# Patient Record
Sex: FEMALE | Race: WHITE
[De-identification: names, ages, dates, MRNs, and addresses within clinical notes are randomized per-mention and may not be internally consistent; named-entity substitution may affect disease eponyms.]

---

## 2021-02-23 ENCOUNTER — HOSPITAL ENCOUNTER (INPATIENT)
Dept: HOSPITAL 12 - REHABOV3 | Age: 86
LOS: 16 days | Discharge: ANOTHER HOSPITAL | DRG: 559 | End: 2021-03-11
Attending: PHYSICAL MEDICINE & REHABILITATION | Admitting: PHYSICAL MEDICINE & REHABILITATION
Payer: MEDICARE

## 2021-02-23 VITALS — WEIGHT: 132 LBS | HEIGHT: 60 IN | BODY MASS INDEX: 25.91 KG/M2

## 2021-02-23 VITALS — DIASTOLIC BLOOD PRESSURE: 60 MMHG | SYSTOLIC BLOOD PRESSURE: 135 MMHG

## 2021-02-23 DIAGNOSIS — Z85.828: ICD-10-CM

## 2021-02-23 DIAGNOSIS — M19.90: ICD-10-CM

## 2021-02-23 DIAGNOSIS — S72.22XD: Primary | ICD-10-CM

## 2021-02-23 DIAGNOSIS — K57.30: ICD-10-CM

## 2021-02-23 DIAGNOSIS — I21.4: ICD-10-CM

## 2021-02-23 DIAGNOSIS — H40.9: ICD-10-CM

## 2021-02-23 DIAGNOSIS — I10: ICD-10-CM

## 2021-02-23 DIAGNOSIS — R06.02: ICD-10-CM
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
## 2021-02-23 DIAGNOSIS — J44.9: ICD-10-CM

## 2021-02-23 DIAGNOSIS — Z91.041: ICD-10-CM

## 2021-02-23 DIAGNOSIS — E78.5: ICD-10-CM

## 2021-02-23 DIAGNOSIS — M79.7: ICD-10-CM

## 2021-02-23 DIAGNOSIS — D64.9: ICD-10-CM

## 2021-02-23 DIAGNOSIS — M81.0: ICD-10-CM

## 2021-02-23 DIAGNOSIS — G43.909: ICD-10-CM

## 2021-02-23 DIAGNOSIS — Z88.8: ICD-10-CM

## 2021-02-23 DIAGNOSIS — I25.10: ICD-10-CM

## 2021-02-23 DIAGNOSIS — M85.80: ICD-10-CM

## 2021-02-23 DIAGNOSIS — H35.30: ICD-10-CM

## 2021-02-23 PROCEDURE — A4663 DIALYSIS BLOOD PRESSURE CUFF: HCPCS

## 2021-02-23 NOTE — NUR
Admitted from [facility] a 85 yr old female with an admitting diagnosis of

S/P ORIF left femur on 02/11/21 by Dr Noriega.AAOx4, demanding at times

with some periods of forgetfulness. Hx of HTN, Skin Ca, Glaucoma, 

Hyperlipidemia, SVT, Fibromyalgia. VSS Patient refused to have body

check, or take picture of her surgical wound. Patient says  she wants

the doctor to check her incision because its a sterile technique. All 

needs attended. Able to ambulate with walker at short distances. Fall

precautions maintained. Voided in bedside commode. Dr Daugherty and Dr Alston aware of patient's admission. Dr Daugherty says he will reconcile 

her meds in am. Denies any pain at this time. Will monitor patient. No

acute distress noted. No acute distress noted.

## 2021-02-24 VITALS — DIASTOLIC BLOOD PRESSURE: 46 MMHG | SYSTOLIC BLOOD PRESSURE: 102 MMHG

## 2021-02-24 VITALS — SYSTOLIC BLOOD PRESSURE: 125 MMHG | DIASTOLIC BLOOD PRESSURE: 57 MMHG

## 2021-02-24 VITALS — SYSTOLIC BLOOD PRESSURE: 124 MMHG | DIASTOLIC BLOOD PRESSURE: 61 MMHG

## 2021-02-24 VITALS — SYSTOLIC BLOOD PRESSURE: 133 MMHG | DIASTOLIC BLOOD PRESSURE: 52 MMHG

## 2021-02-24 RX ADMIN — Medication SCH DROP: at 17:27

## 2021-02-24 RX ADMIN — CALCIUM SCH MG: 500 TABLET ORAL at 17:03

## 2021-02-24 RX ADMIN — Medication SCH DROP: at 17:06

## 2021-02-24 RX ADMIN — Medication SCH DROP: at 17:07

## 2021-02-24 RX ADMIN — Medication PRN MG: at 17:06

## 2021-02-24 RX ADMIN — ACETAMINOPHEN PRN MG: 325 TABLET ORAL at 21:03

## 2021-02-24 RX ADMIN — Medication SCH DROP: at 13:30

## 2021-02-24 RX ADMIN — Medication PRN MG: at 04:11

## 2021-02-24 RX ADMIN — Medication SCH MG: at 17:04

## 2021-02-24 RX ADMIN — GABAPENTIN SCH MG: 100 CAPSULE ORAL at 20:56

## 2021-02-24 RX ADMIN — Medication PRN MG: at 23:17

## 2021-02-24 RX ADMIN — SENNOSIDES SCH TAB: 8.6 TABLET, COATED ORAL at 20:56

## 2021-02-24 RX ADMIN — Medication PRN MG: at 09:40

## 2021-02-24 RX ADMIN — Medication SCH DROP: at 20:58

## 2021-02-24 RX ADMIN — ATORVASTATIN CALCIUM SCH MG: 10 TABLET, FILM COATED ORAL at 20:56

## 2021-02-24 RX ADMIN — SIMETHICONE CHEW TAB 80 MG PRN MG: 80 TABLET ORAL at 18:01

## 2021-02-24 RX ADMIN — DOCUSATE SODIUM SCH MG: 100 CAPSULE, LIQUID FILLED ORAL at 17:03

## 2021-02-24 NOTE — NUR
Received pt resting in bed and watching tv. AAO x4. No acute distress noted. C/o mild pain, 
Tylenol PRN given. Other due meds given as ordered. Safety measures maintained. Call light 
and personal items within reach. Will continue to monitor.

## 2021-02-24 NOTE — NUR
End of shift notes: Patient slept at short intervals.

Assisted to bedside commode.Voiding ok. No acute

distress noted. Pain meds given as needed. Relief

noted. All needs attended and met.

## 2021-02-24 NOTE — NUR
Received pt in bed, awake, A&Ox4, able to verbalize needs, no acute distress noted. Pt on 
RA, no SOB. Pt refuses assessment of surgical dressings at this time. Safety measures, fall 
precautions in place. Call light and belongings within reach. Will continue to monitor.

## 2021-02-24 NOTE — NUR
It was reported that pt has allergy to benadryl, pt denies it and stated that it was a 
mistake in the system. Pt stated that she had allergic reaction IV dye that was done in 
[facility] [place], causing chest rash. Notified Amrita with new order for Benadryl 25mg PO Q6H PRN for 
itching. Will carry out order.

## 2021-02-24 NOTE — NUR
EOSS: Pt in bed, awake, able to verbalize needs throughout shift, needs met promptly. No 
SOB, no acute distress noted. Pt continued to refuse assessment of L hip surgical dressing. 
L knee surgical dressing clean, dry, intact. Pt c/o pain, prn pain medication administered 
per order. Medications reconciled, administered per order. Pt refused artificial tears. 
Safety measures, fall precautions maintained during shift. Call light and belongings within 
reach. Care rendered per order. At end of shift, pt noted with redness on chest. Pt denied 
itchiness, feeling hot, SOB. VSS. Will endorse to night shift.

## 2021-02-25 VITALS — SYSTOLIC BLOOD PRESSURE: 136 MMHG | DIASTOLIC BLOOD PRESSURE: 49 MMHG

## 2021-02-25 VITALS — DIASTOLIC BLOOD PRESSURE: 53 MMHG | SYSTOLIC BLOOD PRESSURE: 120 MMHG

## 2021-02-25 VITALS — SYSTOLIC BLOOD PRESSURE: 134 MMHG | DIASTOLIC BLOOD PRESSURE: 57 MMHG

## 2021-02-25 VITALS — DIASTOLIC BLOOD PRESSURE: 59 MMHG | SYSTOLIC BLOOD PRESSURE: 155 MMHG

## 2021-02-25 RX ADMIN — Medication SCH MG: at 16:54

## 2021-02-25 RX ADMIN — CALCIUM SCH MG: 500 TABLET ORAL at 08:15

## 2021-02-25 RX ADMIN — Medication SCH DROP: at 21:41

## 2021-02-25 RX ADMIN — Medication SCH DROP: at 08:30

## 2021-02-25 RX ADMIN — Medication SCH MG: at 08:19

## 2021-02-25 RX ADMIN — Medication PRN MG: at 08:16

## 2021-02-25 RX ADMIN — FAMOTIDINE SCH MG: 20 TABLET, FILM COATED ORAL at 08:15

## 2021-02-25 RX ADMIN — ENOXAPARIN SODIUM SCH MG: 40 INJECTION SUBCUTANEOUS at 08:18

## 2021-02-25 RX ADMIN — THERA TABS SCH UDTAB: TAB at 08:15

## 2021-02-25 RX ADMIN — Medication SCH DROP: at 16:54

## 2021-02-25 RX ADMIN — SPIRONOLACTONE SCH MG: 25 TABLET, FILM COATED ORAL at 20:00

## 2021-02-25 RX ADMIN — DOCUSATE SODIUM SCH MG: 100 CAPSULE, LIQUID FILLED ORAL at 16:53

## 2021-02-25 RX ADMIN — FUROSEMIDE SCH MG: 20 TABLET ORAL at 08:15

## 2021-02-25 RX ADMIN — Medication SCH ML: at 20:39

## 2021-02-25 RX ADMIN — Medication PRN MG: at 23:36

## 2021-02-25 RX ADMIN — VITAMIN D, TAB 1000IU (100/BT) SCH UNIT: 25 TAB at 08:15

## 2021-02-25 RX ADMIN — SIMETHICONE CHEW TAB 80 MG PRN MG: 80 TABLET ORAL at 11:38

## 2021-02-25 RX ADMIN — Medication SCH DROP: at 16:53

## 2021-02-25 RX ADMIN — CALCIUM SCH MG: 500 TABLET ORAL at 16:53

## 2021-02-25 RX ADMIN — Medication PRN MG: at 17:08

## 2021-02-25 RX ADMIN — Medication SCH DROP: at 05:29

## 2021-02-25 RX ADMIN — Medication SCH TAB: at 20:38

## 2021-02-25 RX ADMIN — Medication SCH DROP: at 13:37

## 2021-02-25 RX ADMIN — DOCUSATE SODIUM SCH MG: 100 CAPSULE, LIQUID FILLED ORAL at 08:15

## 2021-02-25 RX ADMIN — ANALGESIC BALM SCH GM: 1.74; 4.06 OINTMENT TOPICAL at 16:54

## 2021-02-25 RX ADMIN — SIMETHICONE CHEW TAB 80 MG PRN MG: 80 TABLET ORAL at 17:08

## 2021-02-25 RX ADMIN — ATORVASTATIN CALCIUM SCH MG: 10 TABLET, FILM COATED ORAL at 20:39

## 2021-02-25 RX ADMIN — ACETAMINOPHEN PRN MG: 325 TABLET ORAL at 20:56

## 2021-02-25 RX ADMIN — ASPIRIN SCH MG: 81 TABLET, CHEWABLE ORAL at 08:15

## 2021-02-25 RX ADMIN — Medication PRN MG: at 00:22

## 2021-02-25 RX ADMIN — Medication SCH DROP: at 08:16

## 2021-02-25 RX ADMIN — Medication SCH DROP: at 01:56

## 2021-02-25 RX ADMIN — ENOXAPARIN SODIUM SCH MG: 40 INJECTION SUBCUTANEOUS at 09:00

## 2021-02-25 RX ADMIN — SIMETHICONE CHEW TAB 80 MG PRN MG: 80 TABLET ORAL at 08:15

## 2021-02-25 RX ADMIN — ANALGESIC BALM SCH GM: 1.74; 4.06 OINTMENT TOPICAL at 12:24

## 2021-02-25 RX ADMIN — GABAPENTIN SCH MG: 100 CAPSULE ORAL at 20:39

## 2021-02-25 RX ADMIN — Medication PRN MG: at 15:32

## 2021-02-25 RX ADMIN — SENNOSIDES SCH TAB: 8.6 TABLET, COATED ORAL at 20:52

## 2021-02-25 RX ADMIN — ANALGESIC BALM SCH GM: 1.74; 4.06 OINTMENT TOPICAL at 08:29

## 2021-02-25 NOTE — NUR
Received pt sitting in wheelchair. AAO x4. No acute distress noted. C/o mild pain, and 
requested Tylenol, PRN given. Patient refused spironolactone stating "I can't take that, it 
gives me pain in my breast". Safety measures maintained. Call light and personal items 
within reach. Will continue to monitor.

## 2021-02-25 NOTE — NUR
PATIENT REQUESTED TWO HOME MEDICATION TO BE GIVEN DURING HER STAY. MD NOTIFIED. MEDICATIONS 
NOT AVAILABLE IN PHARMACY. NOTIFIED MD AND MD WILL SPEAK TO PHARMACIST FOR ALTERNATIVE 
MEDICATIONS.

## 2021-02-25 NOTE — NUR
PATIENT PLEASANT AND COOPERATIVE. VSS. PATIENT VERBALIZED PAIN, GIVEN PRN PAIN MEDICATION. 
MEDICATIONS GIVEN AS PRESCRIBED. SAFETY PRECAUTIONS IN PLACE. CALL LIGHT WITHIN REACH. ALL 
NEEDS MET. WILL CONTINUE TO MONITOR.

## 2021-02-26 VITALS — SYSTOLIC BLOOD PRESSURE: 117 MMHG | DIASTOLIC BLOOD PRESSURE: 57 MMHG

## 2021-02-26 VITALS — SYSTOLIC BLOOD PRESSURE: 126 MMHG | DIASTOLIC BLOOD PRESSURE: 64 MMHG

## 2021-02-26 VITALS — DIASTOLIC BLOOD PRESSURE: 47 MMHG | SYSTOLIC BLOOD PRESSURE: 128 MMHG

## 2021-02-26 VITALS — SYSTOLIC BLOOD PRESSURE: 142 MMHG | DIASTOLIC BLOOD PRESSURE: 65 MMHG

## 2021-02-26 LAB
BUN SERPL-MCNC: 22 MG/DL (ref 7–18)
CHLORIDE SERPL-SCNC: 101 MMOL/L (ref 98–107)
CO2 SERPL-SCNC: 29 MMOL/L (ref 21–32)
CREAT SERPL-MCNC: 0.9 MG/DL (ref 0.6–1.3)
GLUCOSE SERPL-MCNC: 104 MG/DL (ref 74–106)
POTASSIUM SERPL-SCNC: 4.1 MMOL/L (ref 3.5–5.1)

## 2021-02-26 RX ADMIN — Medication SCH DROP: at 21:30

## 2021-02-26 RX ADMIN — ANALGESIC BALM SCH APPLIC: 1.74; 4.06 OINTMENT TOPICAL at 16:45

## 2021-02-26 RX ADMIN — SPIRONOLACTONE SCH MG: 25 TABLET, FILM COATED ORAL at 09:00

## 2021-02-26 RX ADMIN — SIMETHICONE CHEW TAB 80 MG PRN MG: 80 TABLET ORAL at 02:09

## 2021-02-26 RX ADMIN — Medication SCH DROP: at 21:37

## 2021-02-26 RX ADMIN — ATORVASTATIN CALCIUM SCH MG: 10 TABLET, FILM COATED ORAL at 21:28

## 2021-02-26 RX ADMIN — CALCIUM SCH MG: 500 TABLET ORAL at 16:38

## 2021-02-26 RX ADMIN — ANALGESIC BALM SCH APPLIC: 1.74; 4.06 OINTMENT TOPICAL at 13:27

## 2021-02-26 RX ADMIN — Medication SCH DROP: at 09:32

## 2021-02-26 RX ADMIN — Medication SCH MG: at 16:40

## 2021-02-26 RX ADMIN — Medication SCH DROP: at 09:53

## 2021-02-26 RX ADMIN — ANALGESIC BALM SCH APPLIC: 1.74; 4.06 OINTMENT TOPICAL at 09:36

## 2021-02-26 RX ADMIN — DOCUSATE SODIUM SCH MG: 100 CAPSULE, LIQUID FILLED ORAL at 16:38

## 2021-02-26 RX ADMIN — Medication PRN MG: at 08:12

## 2021-02-26 RX ADMIN — SENNOSIDES SCH TAB: 8.6 TABLET, COATED ORAL at 21:28

## 2021-02-26 RX ADMIN — ENOXAPARIN SODIUM SCH MG: 40 INJECTION SUBCUTANEOUS at 09:00

## 2021-02-26 RX ADMIN — FAMOTIDINE SCH MG: 20 TABLET, FILM COATED ORAL at 09:34

## 2021-02-26 RX ADMIN — FUROSEMIDE SCH MG: 20 TABLET ORAL at 09:34

## 2021-02-26 RX ADMIN — Medication SCH DROP: at 17:45

## 2021-02-26 RX ADMIN — VITAMIN D, TAB 1000IU (100/BT) SCH UNIT: 25 TAB at 09:33

## 2021-02-26 RX ADMIN — Medication SCH DROP: at 01:51

## 2021-02-26 RX ADMIN — ASPIRIN SCH MG: 81 TABLET, CHEWABLE ORAL at 09:33

## 2021-02-26 RX ADMIN — DOCUSATE SODIUM SCH MG: 100 CAPSULE, LIQUID FILLED ORAL at 09:33

## 2021-02-26 RX ADMIN — SIMETHICONE CHEW TAB 80 MG PRN MG: 80 TABLET ORAL at 22:05

## 2021-02-26 RX ADMIN — ACETAMINOPHEN PRN MG: 325 TABLET ORAL at 21:37

## 2021-02-26 RX ADMIN — SIMETHICONE CHEW TAB 80 MG PRN MG: 80 TABLET ORAL at 14:24

## 2021-02-26 RX ADMIN — Medication PRN MG: at 22:05

## 2021-02-26 RX ADMIN — ACETAMINOPHEN PRN MG: 325 TABLET ORAL at 02:05

## 2021-02-26 RX ADMIN — Medication SCH MG: at 09:34

## 2021-02-26 RX ADMIN — GABAPENTIN SCH MG: 100 CAPSULE ORAL at 21:29

## 2021-02-26 RX ADMIN — Medication SCH TAB: at 09:34

## 2021-02-26 RX ADMIN — THERA TABS SCH UDTAB: TAB at 09:33

## 2021-02-26 RX ADMIN — Medication SCH DROP: at 13:28

## 2021-02-26 RX ADMIN — CALCIUM SCH MG: 500 TABLET ORAL at 09:35

## 2021-02-26 RX ADMIN — Medication SCH DROP: at 05:27

## 2021-02-26 RX ADMIN — Medication PRN MG: at 14:28

## 2021-02-26 NOTE — NUR
Patient is alert, oriented x 4, not in any form of distress, on room air. She complained of 
pain, given prn pain medication as ordered with noted relief. Patient refused due 
spironolactone and lovenox, explained risks and benefits but patient still refused. Roro Crowe NP in the unit, informed NP regarding patient's refusal and NP gave no new order. 
Assisted patient with her needs. Call light and frequently used items placed within reach.

## 2021-02-26 NOTE — NUR
Received pt resting in bed and watching tv. AAO x4. No acute distress noted. VSS. C/o rash/ 
itching on chest and back, requested another dose of Benadryl Notified Dr. Roro Crowe, 
received new orders. All due medication administered and tolerated well. C/o mild pain, 
Tylenol PRN given. Per pt request administered Bismuth and simethicone PRN. Needs attended 
too promptly. Safety measures maintained. Call light and personal items within reach. Will 
continue to monitor.

## 2021-02-27 VITALS — SYSTOLIC BLOOD PRESSURE: 141 MMHG | DIASTOLIC BLOOD PRESSURE: 62 MMHG

## 2021-02-27 VITALS — DIASTOLIC BLOOD PRESSURE: 53 MMHG | SYSTOLIC BLOOD PRESSURE: 121 MMHG

## 2021-02-27 RX ADMIN — CALCIUM SCH MG: 500 TABLET ORAL at 17:00

## 2021-02-27 RX ADMIN — SPIRONOLACTONE SCH MG: 25 TABLET, FILM COATED ORAL at 12:37

## 2021-02-27 RX ADMIN — VITAMIN D, TAB 1000IU (100/BT) SCH UNIT: 25 TAB at 09:23

## 2021-02-27 RX ADMIN — FUROSEMIDE SCH MG: 20 TABLET ORAL at 08:11

## 2021-02-27 RX ADMIN — Medication PRN MG: at 08:07

## 2021-02-27 RX ADMIN — ACETAMINOPHEN PRN MG: 325 TABLET ORAL at 21:41

## 2021-02-27 RX ADMIN — Medication SCH DROP: at 13:27

## 2021-02-27 RX ADMIN — SIMETHICONE CHEW TAB 80 MG PRN MG: 80 TABLET ORAL at 10:46

## 2021-02-27 RX ADMIN — Medication SCH DROP: at 20:20

## 2021-02-27 RX ADMIN — DOCUSATE SODIUM SCH MG: 100 CAPSULE, LIQUID FILLED ORAL at 17:09

## 2021-02-27 RX ADMIN — ASPIRIN SCH MG: 81 TABLET, COATED ORAL at 08:10

## 2021-02-27 RX ADMIN — Medication SCH DROP: at 01:34

## 2021-02-27 RX ADMIN — THERA TABS SCH UDTAB: TAB at 09:23

## 2021-02-27 RX ADMIN — Medication SCH DROP: at 17:23

## 2021-02-27 RX ADMIN — GABAPENTIN SCH MG: 100 CAPSULE ORAL at 20:20

## 2021-02-27 RX ADMIN — ATORVASTATIN CALCIUM SCH MG: 10 TABLET, FILM COATED ORAL at 20:20

## 2021-02-27 RX ADMIN — Medication PRN MG: at 00:07

## 2021-02-27 RX ADMIN — Medication SCH DROP: at 21:35

## 2021-02-27 RX ADMIN — FAMOTIDINE SCH MG: 20 TABLET, FILM COATED ORAL at 09:23

## 2021-02-27 RX ADMIN — Medication SCH DROP: at 09:40

## 2021-02-27 RX ADMIN — ANALGESIC BALM SCH GM: 1.74; 4.06 OINTMENT TOPICAL at 12:55

## 2021-02-27 RX ADMIN — CALCIUM SCH MG: 500 TABLET ORAL at 09:23

## 2021-02-27 RX ADMIN — ENOXAPARIN SODIUM SCH MG: 40 INJECTION SUBCUTANEOUS at 08:14

## 2021-02-27 RX ADMIN — Medication SCH MG: at 08:11

## 2021-02-27 RX ADMIN — Medication PRN MG: at 21:41

## 2021-02-27 RX ADMIN — SENNOSIDES SCH TAB: 8.6 TABLET, COATED ORAL at 20:26

## 2021-02-27 RX ADMIN — ANALGESIC BALM SCH APPLIC: 1.74; 4.06 OINTMENT TOPICAL at 17:20

## 2021-02-27 RX ADMIN — DOCUSATE SODIUM SCH MG: 100 CAPSULE, LIQUID FILLED ORAL at 08:13

## 2021-02-27 RX ADMIN — Medication PRN MG: at 10:48

## 2021-02-27 RX ADMIN — Medication SCH DROP: at 05:36

## 2021-02-27 RX ADMIN — Medication SCH TAB: at 09:23

## 2021-02-27 RX ADMIN — Medication SCH DROP: at 08:26

## 2021-02-27 RX ADMIN — Medication SCH MG: at 17:00

## 2021-02-27 RX ADMIN — Medication PRN MG: at 15:38

## 2021-02-27 RX ADMIN — Medication PRN MG: at 22:42

## 2021-02-27 RX ADMIN — SIMETHICONE CHEW TAB 80 MG PRN MG: 80 TABLET ORAL at 22:42

## 2021-02-27 RX ADMIN — SPIRONOLACTONE SCH MG: 25 TABLET, FILM COATED ORAL at 08:17

## 2021-02-27 RX ADMIN — ANALGESIC BALM SCH GM: 1.74; 4.06 OINTMENT TOPICAL at 09:00

## 2021-02-27 NOTE — NUR
c/o of 10/10 pain in hip. Administered OXyir PRN. Repositioned pt for comfort. Applied SCD 
pumps. Bilateral heels offloaded. Will continue plan of care.

## 2021-02-27 NOTE — NUR
patient is alert, oriented x4, no sob, resp even nonlabored, skin warm and dry to touch, 
patient noted with irritable behavior and very canela, refused her diuretic in the morning, 
stated this is wrong medication, try to explain to patient but she still kept saying its 
wrong medication, i do not want to take it, patient also stated that she wants to take her 
vitamins at lunch time, patient is very demanding and needy, calls to the station frequently 
even just helped and assisted with needs, needs attended timely, PT OT services provided as 
ordered, assisted with bed commode, kept clean and dry, sitting in chair, no distress noted 
at this time.

## 2021-02-27 NOTE — NUR
Received patient alert and oriented x3-4 with periods of forgetfulness. Able to make needs 
known. No s/sx of distress. Patient walks by herself. Instructed the patient of the safety 
precautions and placed call light within reach. Patient verbalized understanding but still 
tries to walks by herself. Provide frequent visual observation for safety. Dr. Alston order 
for Hydroxyzine 25 mg IM one time only for itchiness. 

-------------------------------------------------------------------------------

Addendum: 02/27/21 at 1854 by MADAN ADKINS RN RN

-------------------------------------------------------------------------------

per pharmacy does not carry hydroxyzine IM only PO. Order changed to Hydroxyzine 25 mg PO 
one time only for itchiness.

## 2021-02-27 NOTE — NUR
Received pt sitting in the wheelchair at bedside and watching tv. AAO x4. No acute distress 
noted. C/o mild pain on left femur area, pt requesting tylenol and was given. Other due meds 
given as well. Encouraged pt to use call light when ready to go back to bed, which pt agreed 
to. Safety measures maintained. Call light and personal items within reach. Will continue to 
monitor.

## 2021-02-28 VITALS — DIASTOLIC BLOOD PRESSURE: 50 MMHG | SYSTOLIC BLOOD PRESSURE: 104 MMHG

## 2021-02-28 VITALS — DIASTOLIC BLOOD PRESSURE: 65 MMHG | SYSTOLIC BLOOD PRESSURE: 109 MMHG

## 2021-02-28 RX ADMIN — Medication SCH DROP: at 21:19

## 2021-02-28 RX ADMIN — VITAMIN D, TAB 1000IU (100/BT) SCH UNIT: 25 TAB at 09:30

## 2021-02-28 RX ADMIN — DOCUSATE SODIUM SCH MG: 100 CAPSULE, LIQUID FILLED ORAL at 16:29

## 2021-02-28 RX ADMIN — GABAPENTIN SCH MG: 100 CAPSULE ORAL at 21:27

## 2021-02-28 RX ADMIN — Medication PRN MG: at 04:04

## 2021-02-28 RX ADMIN — Medication SCH DROP: at 16:27

## 2021-02-28 RX ADMIN — Medication SCH MG: at 16:29

## 2021-02-28 RX ADMIN — ANALGESIC BALM SCH APPLIC: 1.74; 4.06 OINTMENT TOPICAL at 09:00

## 2021-02-28 RX ADMIN — ACETAMINOPHEN PRN MG: 325 TABLET ORAL at 05:21

## 2021-02-28 RX ADMIN — ANALGESIC BALM SCH APPLIC: 1.74; 4.06 OINTMENT TOPICAL at 16:29

## 2021-02-28 RX ADMIN — Medication SCH DROP: at 09:40

## 2021-02-28 RX ADMIN — SPIRONOLACTONE SCH MG: 25 TABLET, FILM COATED ORAL at 09:36

## 2021-02-28 RX ADMIN — Medication SCH TAB: at 09:34

## 2021-02-28 RX ADMIN — FUROSEMIDE SCH MG: 20 TABLET ORAL at 09:30

## 2021-02-28 RX ADMIN — THERA TABS SCH UDTAB: TAB at 09:30

## 2021-02-28 RX ADMIN — Medication SCH DROP: at 05:08

## 2021-02-28 RX ADMIN — ENOXAPARIN SODIUM SCH MG: 40 INJECTION SUBCUTANEOUS at 09:00

## 2021-02-28 RX ADMIN — DOCUSATE SODIUM SCH MG: 100 CAPSULE, LIQUID FILLED ORAL at 09:29

## 2021-02-28 RX ADMIN — Medication SCH DROP: at 01:06

## 2021-02-28 RX ADMIN — Medication SCH MG: at 09:00

## 2021-02-28 RX ADMIN — CALCIUM SCH MG: 500 TABLET ORAL at 16:26

## 2021-02-28 RX ADMIN — ATORVASTATIN CALCIUM SCH MG: 10 TABLET, FILM COATED ORAL at 21:16

## 2021-02-28 RX ADMIN — ASPIRIN SCH MG: 81 TABLET, COATED ORAL at 09:30

## 2021-02-28 RX ADMIN — Medication SCH DROP: at 13:25

## 2021-02-28 RX ADMIN — ANALGESIC BALM SCH APPLIC: 1.74; 4.06 OINTMENT TOPICAL at 12:16

## 2021-02-28 RX ADMIN — Medication PRN MG: at 16:26

## 2021-02-28 RX ADMIN — SENNOSIDES SCH TAB: 8.6 TABLET, COATED ORAL at 21:00

## 2021-02-28 RX ADMIN — Medication SCH DROP: at 21:50

## 2021-02-28 RX ADMIN — Medication SCH DROP: at 09:38

## 2021-02-28 RX ADMIN — FAMOTIDINE SCH MG: 20 TABLET, FILM COATED ORAL at 09:29

## 2021-02-28 RX ADMIN — CALCIUM SCH MG: 500 TABLET ORAL at 09:00

## 2021-02-28 RX ADMIN — Medication PRN MG: at 13:27

## 2021-02-28 RX ADMIN — SIMETHICONE CHEW TAB 80 MG PRN MG: 80 TABLET ORAL at 09:34

## 2021-02-28 RX ADMIN — ACETAMINOPHEN PRN MG: 325 TABLET ORAL at 21:15

## 2021-02-28 RX ADMIN — Medication PRN MG: at 21:16

## 2021-02-28 NOTE — NUR
Patient alert and oriented x 4 with periods of forgetfulness, cooperative upon assessment, 
all needs met promptly. On room air saturationg at 95% with no s/s of distress. VS WNL.

## 2021-03-01 VITALS — DIASTOLIC BLOOD PRESSURE: 56 MMHG | SYSTOLIC BLOOD PRESSURE: 143 MMHG

## 2021-03-01 VITALS — DIASTOLIC BLOOD PRESSURE: 65 MMHG | SYSTOLIC BLOOD PRESSURE: 139 MMHG

## 2021-03-01 VITALS — SYSTOLIC BLOOD PRESSURE: 123 MMHG | DIASTOLIC BLOOD PRESSURE: 75 MMHG

## 2021-03-01 VITALS — DIASTOLIC BLOOD PRESSURE: 61 MMHG | SYSTOLIC BLOOD PRESSURE: 121 MMHG

## 2021-03-01 RX ADMIN — ANALGESIC BALM SCH APPLIC: 1.74; 4.06 OINTMENT TOPICAL at 13:00

## 2021-03-01 RX ADMIN — Medication SCH DROP: at 13:12

## 2021-03-01 RX ADMIN — Medication SCH DROP: at 05:45

## 2021-03-01 RX ADMIN — Medication PRN MG: at 00:31

## 2021-03-01 RX ADMIN — Medication PRN MG: at 16:14

## 2021-03-01 RX ADMIN — THERA TABS SCH UDTAB: TAB at 08:33

## 2021-03-01 RX ADMIN — Medication SCH MG: at 08:27

## 2021-03-01 RX ADMIN — Medication SCH DROP: at 08:34

## 2021-03-01 RX ADMIN — Medication SCH DROP: at 16:28

## 2021-03-01 RX ADMIN — ANALGESIC BALM SCH APPLIC: 1.74; 4.06 OINTMENT TOPICAL at 16:19

## 2021-03-01 RX ADMIN — Medication SCH TAB: at 08:32

## 2021-03-01 RX ADMIN — VITAMIN D, TAB 1000IU (100/BT) SCH UNIT: 25 TAB at 08:33

## 2021-03-01 RX ADMIN — FAMOTIDINE SCH MG: 20 TABLET, FILM COATED ORAL at 08:27

## 2021-03-01 RX ADMIN — DOCUSATE SODIUM SCH MG: 100 CAPSULE, LIQUID FILLED ORAL at 16:12

## 2021-03-01 RX ADMIN — ANALGESIC BALM SCH APPLIC: 1.74; 4.06 OINTMENT TOPICAL at 08:34

## 2021-03-01 RX ADMIN — CALCIUM SCH MG: 500 TABLET ORAL at 08:26

## 2021-03-01 RX ADMIN — ENOXAPARIN SODIUM SCH MG: 40 INJECTION SUBCUTANEOUS at 08:33

## 2021-03-01 RX ADMIN — Medication PRN MG: at 16:13

## 2021-03-01 RX ADMIN — ACETAMINOPHEN PRN MG: 325 TABLET ORAL at 02:45

## 2021-03-01 RX ADMIN — Medication SCH DROP: at 23:18

## 2021-03-01 RX ADMIN — CALCIUM SCH MG: 500 TABLET ORAL at 16:18

## 2021-03-01 RX ADMIN — SPIRONOLACTONE SCH MG: 25 TABLET, FILM COATED ORAL at 08:32

## 2021-03-01 RX ADMIN — Medication PRN MG: at 22:20

## 2021-03-01 RX ADMIN — Medication SCH DROP: at 08:32

## 2021-03-01 RX ADMIN — SENNOSIDES SCH TAB: 8.6 TABLET, COATED ORAL at 22:04

## 2021-03-01 RX ADMIN — GABAPENTIN SCH MG: 100 CAPSULE ORAL at 22:05

## 2021-03-01 RX ADMIN — Medication SCH DROP: at 01:48

## 2021-03-01 RX ADMIN — FUROSEMIDE SCH MG: 20 TABLET ORAL at 08:27

## 2021-03-01 RX ADMIN — DOCUSATE SODIUM SCH MG: 100 CAPSULE, LIQUID FILLED ORAL at 08:28

## 2021-03-01 RX ADMIN — ACETAMINOPHEN PRN MG: 325 TABLET ORAL at 12:46

## 2021-03-01 RX ADMIN — SPIRONOLACTONE SCH MG: 25 TABLET, FILM COATED ORAL at 12:49

## 2021-03-01 RX ADMIN — THERA TABS SCH UDTAB: TAB at 12:48

## 2021-03-01 RX ADMIN — SIMETHICONE CHEW TAB 80 MG PRN MG: 80 TABLET ORAL at 12:45

## 2021-03-01 RX ADMIN — ATORVASTATIN CALCIUM SCH MG: 10 TABLET, FILM COATED ORAL at 22:05

## 2021-03-01 RX ADMIN — Medication SCH TAB: at 12:49

## 2021-03-01 RX ADMIN — ASPIRIN SCH MG: 81 TABLET, COATED ORAL at 08:28

## 2021-03-01 RX ADMIN — Medication SCH MG: at 22:05

## 2021-03-01 NOTE — NUR
STILL SITTING UP GETS ONTO THE COMMODE AND THEN BACK SITTING ON THE CHAIR STILL REFUSING TO 
GET INTO BED TO ELEVATE HER LOWER EXTREMITIES WILL ENDORSE PATIENT WAS MEDICATED THROUGHOUT 
THE SHIFT WITH PAIN MEDICATIONS AND ITCHING MEDS ALL AS ORDERED PER HER REQUEST AND SHE 
EXPRESSED THEM BEING EFFECTIVE CALL LIGHT ARE WITHIN EASY REACH WILL CONTINUE TO OBSERVE.

## 2021-03-01 NOTE — NUR
Patient A/Ox 4, very pleasant and cooperative. all needs met promptly. On Room Air, SPO2 95% 
with no s/s of distress. VS WNL. Requested Oxycodon and Tylenol 2x t/o shift. Call light 
within reach

## 2021-03-01 NOTE — NUR
RECEIVED PATIENT IN ROOM JUST FINISHED USING THE BEDSIDE COMMODE AND TRANSFERING TO THE 
W/CHAIR WITH ASSIGNED CNA ASSISTING PATIENT IS ALERT AND ORIENTED PICKED AND CHOOSE WHICH 
MEDICATIONS SHE WANTED TO TAKE REFUSED HER LOVENOX DESPITE EXPLAINATIONS AND EDUCATION ON 
THE BENEFIT OF THIS MED PATIENTS RIGHT TO REFUSE RESPECTED WILL CONTINUE WITH PT/OT AS 
ORDERED NO C/O PAIN OR DISCOMFORTS AT THIS TIME CALL LIGHTS AND PERSONAL BELONGINGS ARE 
WITHIN EASY REACH WILL CONTINUE TO OBSERVE.

## 2021-03-01 NOTE — NUR
PATIENT ALERT ORIENTED, RECEIVED SEATED AT THE CHAIR, ENCOURAGED PATIENT TO GET BACK TO BED 
SO THAT LOWER EXTREMITIES CAN BE ELEVATED WITH PILLOW, BUT REFUSED, PREFER TO STAY SEATED IN 
THE CHAIR. PATIENT HAS COMPLAIN OF LEFT HIP PAIN, WILL MEDICATED AS ORDER, CALL LIGHT WITHIN 
REACH.

## 2021-03-01 NOTE — NUR
PATIENT IS AWAKE ALERT AND ORIENTED TOLERATED PHYSICAL THERAPEUTIC EXERCISES AS ORDERED 
PATIENT IS SITTING ON THE CHAIR LONG PEROIDS OT TIME ENCOURAGED HER TO LAY DOWN ON THE BED 
AND REST AND TO RELIEVE PRESSURE FROM HER BILATERAL LOWER EXTREMITIES THAT ARE SWOLLEN AT 2 
PLUS AT THIS TIME AND SHE DECLINED TO GET INTO BED OFFERED HER TO HAVE HER BOTH FEET 
ELEVATED ON O STOOL OR PILLOW AND SHE ALSO DECLINED STATED THAT SHE IS MORE COMFORTABLE 
SITTING ON THE CHAIR AT THIS TIME

## 2021-03-02 VITALS — SYSTOLIC BLOOD PRESSURE: 132 MMHG | DIASTOLIC BLOOD PRESSURE: 65 MMHG

## 2021-03-02 VITALS — DIASTOLIC BLOOD PRESSURE: 64 MMHG | SYSTOLIC BLOOD PRESSURE: 123 MMHG

## 2021-03-02 VITALS — SYSTOLIC BLOOD PRESSURE: 115 MMHG | DIASTOLIC BLOOD PRESSURE: 94 MMHG

## 2021-03-02 VITALS — DIASTOLIC BLOOD PRESSURE: 62 MMHG | SYSTOLIC BLOOD PRESSURE: 128 MMHG

## 2021-03-02 RX ADMIN — ENOXAPARIN SODIUM SCH MG: 40 INJECTION SUBCUTANEOUS at 08:42

## 2021-03-02 RX ADMIN — Medication PRN MG: at 09:53

## 2021-03-02 RX ADMIN — SPIRONOLACTONE SCH MG: 25 TABLET, FILM COATED ORAL at 08:38

## 2021-03-02 RX ADMIN — Medication SCH DROP: at 12:33

## 2021-03-02 RX ADMIN — Medication SCH DROP: at 08:42

## 2021-03-02 RX ADMIN — CALCIUM SCH MG: 500 TABLET ORAL at 08:42

## 2021-03-02 RX ADMIN — Medication SCH DROP: at 16:58

## 2021-03-02 RX ADMIN — SENNOSIDES SCH TAB: 8.6 TABLET, COATED ORAL at 20:34

## 2021-03-02 RX ADMIN — SIMETHICONE CHEW TAB 80 MG PRN MG: 80 TABLET ORAL at 20:21

## 2021-03-02 RX ADMIN — ATORVASTATIN CALCIUM SCH MG: 10 TABLET, FILM COATED ORAL at 20:21

## 2021-03-02 RX ADMIN — ANALGESIC BALM SCH APPLIC: 1.74; 4.06 OINTMENT TOPICAL at 08:43

## 2021-03-02 RX ADMIN — Medication SCH TAB: at 12:32

## 2021-03-02 RX ADMIN — CALCIUM SCH MG: 500 TABLET ORAL at 12:32

## 2021-03-02 RX ADMIN — Medication PRN MG: at 03:04

## 2021-03-02 RX ADMIN — FUROSEMIDE SCH MG: 20 TABLET ORAL at 08:40

## 2021-03-02 RX ADMIN — ANALGESIC BALM SCH APPLIC: 1.74; 4.06 OINTMENT TOPICAL at 12:33

## 2021-03-02 RX ADMIN — SIMETHICONE CHEW TAB 80 MG PRN MG: 80 TABLET ORAL at 11:49

## 2021-03-02 RX ADMIN — ASPIRIN SCH MG: 81 TABLET, COATED ORAL at 08:38

## 2021-03-02 RX ADMIN — ACETAMINOPHEN PRN MG: 325 TABLET ORAL at 00:14

## 2021-03-02 RX ADMIN — ANALGESIC BALM SCH APPLIC: 1.74; 4.06 OINTMENT TOPICAL at 16:56

## 2021-03-02 RX ADMIN — ACETAMINOPHEN PRN MG: 325 TABLET ORAL at 14:17

## 2021-03-02 RX ADMIN — Medication SCH MG: at 20:29

## 2021-03-02 RX ADMIN — THERA TABS SCH UDTAB: TAB at 12:32

## 2021-03-02 RX ADMIN — DOCUSATE SODIUM SCH MG: 100 CAPSULE, LIQUID FILLED ORAL at 08:40

## 2021-03-02 RX ADMIN — Medication SCH TAB: at 08:42

## 2021-03-02 RX ADMIN — ACETAMINOPHEN PRN MG: 325 TABLET ORAL at 20:21

## 2021-03-02 RX ADMIN — FAMOTIDINE SCH MG: 20 TABLET, FILM COATED ORAL at 08:40

## 2021-03-02 RX ADMIN — Medication PRN MG: at 17:28

## 2021-03-02 RX ADMIN — Medication SCH DROP: at 06:00

## 2021-03-02 RX ADMIN — VITAMIN D, TAB 1000IU (100/BT) SCH UNIT: 25 TAB at 08:38

## 2021-03-02 RX ADMIN — GABAPENTIN SCH MG: 100 CAPSULE ORAL at 20:21

## 2021-03-02 RX ADMIN — Medication PRN MG: at 00:43

## 2021-03-02 RX ADMIN — Medication SCH DROP: at 21:29

## 2021-03-02 RX ADMIN — DOCUSATE SODIUM SCH MG: 100 CAPSULE, LIQUID FILLED ORAL at 16:56

## 2021-03-02 RX ADMIN — Medication SCH DROP: at 22:16

## 2021-03-02 RX ADMIN — THERA TABS SCH UDTAB: TAB at 08:42

## 2021-03-02 RX ADMIN — CALCIUM SCH MG: 500 TABLET ORAL at 16:56

## 2021-03-02 RX ADMIN — Medication SCH DROP: at 08:43

## 2021-03-02 RX ADMIN — Medication SCH DROP: at 01:51

## 2021-03-02 RX ADMIN — Medication SCH MG: at 08:39

## 2021-03-02 NOTE — NUR
PATIENT IS REQUESTING FOR HER ALDACTONE TO BE INCREASED BACK TO 25 MG DR LUTHER NOTIFIED 
WITH OKAY TO INCREASE.ALSO NOTED AN ENTRY FROM THE  THAT PATIENT IS TO HAVE AN 
APPOINTMENT 2 WEEKS POST OP UNABLE TO DETERMINE IF PATIENT HAS ALREADY SEEN DR RAKESH HOLLOWAY SO SPOKE WITH CARLA THE  STATED THAT HE WILL CALL DR MANZO OFFICE TO CONFIRM 
WHEN THE PATIENT IS TO BE SEEN WILL AWAIT FOR HIS INPUT PATIENT STATED DID NOT GO TO THE 
APPOINTMENT AND NOT INTERESTED IN GOING FOR THIS APPOINTMENT CARLA STATED WILL CALL DR MANZO 
OFFICE.

## 2021-03-02 NOTE — NUR
PT RECEIVED ALERT AND ORIENTED, SITTING ON BEDSIDE COMMODE. ON RA WITH NO SOB. DENIES PAINS 
OR DISCOMFORTS AT THIS TIME. DUE MEDICATIONS GIVEN. CALL LIGHTS AND ALL PERSONAL BELONGINGS 
WITHIN EASY REACH. BOTH LOWER EXTREMITIES CONTINUE TO BE SWOLLEN. PATIENT CONTINUES TO BE 
EDUCATED TO ELEVATE LEGS AND SHE HAS EXPRESSED UNDERSTANDING. WILL CONTINUE TO OBSERVE AND 
PROVIDE COMFORT.

## 2021-03-02 NOTE — NUR
D/C PLANNING LEFT HIP XRAY AND RAPID COVID 19 TEST DONE AS ORDERED RAPID COVID TEST IS 
NEGATIVE PER LAB.

## 2021-03-02 NOTE — NUR
PATIENT ASLEEP BUT EASILY AROUSABLE, NO SOB NO CHEST PAIN, CONT ON PAIN MANAGEMENT OF LEFT 
HIP AND BACK, PATIENT ASSISTED WITH TOILETING, CONT TO MONITOR.

## 2021-03-03 VITALS — DIASTOLIC BLOOD PRESSURE: 58 MMHG | SYSTOLIC BLOOD PRESSURE: 111 MMHG

## 2021-03-03 VITALS — DIASTOLIC BLOOD PRESSURE: 58 MMHG | SYSTOLIC BLOOD PRESSURE: 129 MMHG

## 2021-03-03 VITALS — SYSTOLIC BLOOD PRESSURE: 132 MMHG | DIASTOLIC BLOOD PRESSURE: 58 MMHG

## 2021-03-03 VITALS — DIASTOLIC BLOOD PRESSURE: 63 MMHG | SYSTOLIC BLOOD PRESSURE: 125 MMHG

## 2021-03-03 RX ADMIN — CALCIUM SCH MG: 500 TABLET ORAL at 17:00

## 2021-03-03 RX ADMIN — ANALGESIC BALM SCH APPLIC: 1.74; 4.06 OINTMENT TOPICAL at 17:19

## 2021-03-03 RX ADMIN — VITAMIN D, TAB 1000IU (100/BT) SCH UNIT: 25 TAB at 09:36

## 2021-03-03 RX ADMIN — ANALGESIC BALM SCH APPLIC: 1.74; 4.06 OINTMENT TOPICAL at 09:39

## 2021-03-03 RX ADMIN — Medication SCH TAB: at 09:36

## 2021-03-03 RX ADMIN — CALCIUM SCH MG: 500 TABLET ORAL at 09:00

## 2021-03-03 RX ADMIN — Medication SCH MG: at 21:28

## 2021-03-03 RX ADMIN — ATORVASTATIN CALCIUM SCH MG: 10 TABLET, FILM COATED ORAL at 21:28

## 2021-03-03 RX ADMIN — SIMETHICONE CHEW TAB 80 MG PRN MG: 80 TABLET ORAL at 21:28

## 2021-03-03 RX ADMIN — ACETAMINOPHEN PRN MG: 325 TABLET ORAL at 21:28

## 2021-03-03 RX ADMIN — DOCUSATE SODIUM SCH MG: 100 CAPSULE, LIQUID FILLED ORAL at 09:37

## 2021-03-03 RX ADMIN — ANALGESIC BALM SCH APPLIC: 1.74; 4.06 OINTMENT TOPICAL at 14:46

## 2021-03-03 RX ADMIN — Medication PRN MG: at 14:46

## 2021-03-03 RX ADMIN — Medication SCH DROP: at 21:42

## 2021-03-03 RX ADMIN — CALCIUM SCH MG: 500 TABLET ORAL at 09:36

## 2021-03-03 RX ADMIN — ACETAMINOPHEN PRN MG: 325 TABLET ORAL at 04:52

## 2021-03-03 RX ADMIN — ENOXAPARIN SODIUM SCH MG: 40 INJECTION SUBCUTANEOUS at 09:00

## 2021-03-03 RX ADMIN — SIMETHICONE CHEW TAB 80 MG PRN MG: 80 TABLET ORAL at 09:50

## 2021-03-03 RX ADMIN — Medication SCH DROP: at 01:33

## 2021-03-03 RX ADMIN — SPIRONOLACTONE SCH MG: 25 TABLET, FILM COATED ORAL at 09:37

## 2021-03-03 RX ADMIN — FAMOTIDINE SCH MG: 20 TABLET, FILM COATED ORAL at 09:37

## 2021-03-03 RX ADMIN — ENOXAPARIN SODIUM SCH MG: 40 INJECTION SUBCUTANEOUS at 09:39

## 2021-03-03 RX ADMIN — Medication SCH DROP: at 09:40

## 2021-03-03 RX ADMIN — GABAPENTIN SCH MG: 100 CAPSULE ORAL at 21:28

## 2021-03-03 RX ADMIN — Medication SCH DROP: at 05:43

## 2021-03-03 RX ADMIN — THERA TABS SCH UDTAB: TAB at 09:36

## 2021-03-03 RX ADMIN — Medication SCH DROP: at 21:29

## 2021-03-03 RX ADMIN — DOCUSATE SODIUM SCH MG: 100 CAPSULE, LIQUID FILLED ORAL at 17:19

## 2021-03-03 RX ADMIN — SENNOSIDES SCH TAB: 8.6 TABLET, COATED ORAL at 21:28

## 2021-03-03 RX ADMIN — FUROSEMIDE SCH MG: 20 TABLET ORAL at 09:37

## 2021-03-03 RX ADMIN — Medication PRN MG: at 21:28

## 2021-03-03 RX ADMIN — Medication SCH DROP: at 14:39

## 2021-03-03 RX ADMIN — Medication SCH DROP: at 17:19

## 2021-03-03 RX ADMIN — Medication SCH DROP: at 09:41

## 2021-03-03 RX ADMIN — SIMETHICONE CHEW TAB 80 MG PRN MG: 80 TABLET ORAL at 14:46

## 2021-03-03 RX ADMIN — ASPIRIN SCH MG: 81 TABLET, COATED ORAL at 09:37

## 2021-03-03 RX ADMIN — Medication PRN MG: at 02:10

## 2021-03-03 RX ADMIN — Medication SCH MG: at 09:37

## 2021-03-03 RX ADMIN — Medication PRN MG: at 23:03

## 2021-03-03 NOTE — NUR
Patient on bedside commode c/o body itch . Medicated with benadryl.Patient stated she 
couldn't sleep and  wants Ambien at this time.Left hip dressing intact clean and dry.Refused 
dressing changed.Stated to do it in the morning.Requesting for tylenol ,instead of 
ambien.Assisted patient back to bed.

## 2021-03-03 NOTE — NUR
RECEIVED CHANGE OF SHIFT REPORT ON PT. PT IN BED RESTING, AWAKE ALERT AND ORIENTED X4. NO 
SIGNS OF DISTRESS NOTED, NO REPORTS OF PAIN AT THIS TIME. PT ON ROOM AIR. PT URINATES VIA 
BEDSIDE COMMODE WITH ASSISTANCE. PT USES WALKER TO AMBULATE, NO IV ACCESS. BED IN LOW AND 
LOCKED POSITION, CALL LIGHT WITHIN REACH, SAFETY AND FALL PRECAUTIONS IN PLACE. WILL 
CONTINUE TO MONITOR.

## 2021-03-03 NOTE — NUR
Received pt sitting in wheelchair watching TV. AAO x4. No acute distress noted. VSS. C/o of 
mild pain in hip, administered Tylenol PRN. All due medication administered and tolerated 
well. Per pt request administered Bismuth and simethicone PRN. Needs attended too promptly. 
Safety measures maintained. Call light and personal items within reach. Will continue to 
monitor. Safety measures and fall precautions in place. Bed locked, in low position, and 
alarm on. Will continue with plan of care.

## 2021-03-03 NOTE — NUR
PT IN BED RESTING, AWAKE ALERT AND ORIENTED X4. PT ON ROOM AIR, NO SIGNS OF DISTRESS NOTED, 
NO IV ACCESS.  MEDICATIONS GIVEN AS ORDERED, NO REPORTS OF PAIN NOTED. ALL NEEDS ADDRESSED 
DURING THIS SHIFT. PT CONTINENT OF BOWEL AND BLADDER, BRP, BEDSIDE COMMODE. BED IN LOW AND 
LOCKED POSITION, CALL LIGHT WITHIN REACH, SAFETY AND FALL PRECAUTIONS IN PLACE, WILL ENDORSE 
TO ONCOMING NURSE.

## 2021-03-03 NOTE — NUR
Patient c/o left hip pain .Sitting on bedside commode.Oxycodone 5 mg given.On Ra .No s/s of 
distress noted. Assisted patient back to bed. Continue safety measure.Call light with in 
reach.

## 2021-03-04 VITALS — SYSTOLIC BLOOD PRESSURE: 133 MMHG | DIASTOLIC BLOOD PRESSURE: 65 MMHG

## 2021-03-04 VITALS — SYSTOLIC BLOOD PRESSURE: 138 MMHG | DIASTOLIC BLOOD PRESSURE: 60 MMHG

## 2021-03-04 VITALS — DIASTOLIC BLOOD PRESSURE: 72 MMHG | SYSTOLIC BLOOD PRESSURE: 119 MMHG

## 2021-03-04 VITALS — DIASTOLIC BLOOD PRESSURE: 62 MMHG | SYSTOLIC BLOOD PRESSURE: 118 MMHG

## 2021-03-04 RX ADMIN — ACETAMINOPHEN PRN MG: 325 TABLET ORAL at 08:04

## 2021-03-04 RX ADMIN — Medication PRN MG: at 16:03

## 2021-03-04 RX ADMIN — SIMETHICONE CHEW TAB 80 MG PRN MG: 80 TABLET ORAL at 13:18

## 2021-03-04 RX ADMIN — ASPIRIN SCH MG: 81 TABLET, COATED ORAL at 08:35

## 2021-03-04 RX ADMIN — SIMETHICONE CHEW TAB 80 MG PRN MG: 80 TABLET ORAL at 23:33

## 2021-03-04 RX ADMIN — DOCUSATE SODIUM SCH MG: 100 CAPSULE, LIQUID FILLED ORAL at 08:35

## 2021-03-04 RX ADMIN — Medication SCH DROP: at 20:34

## 2021-03-04 RX ADMIN — DOCUSATE SODIUM SCH MG: 100 CAPSULE, LIQUID FILLED ORAL at 16:03

## 2021-03-04 RX ADMIN — Medication PRN MG: at 23:34

## 2021-03-04 RX ADMIN — Medication SCH DROP: at 08:37

## 2021-03-04 RX ADMIN — ANALGESIC BALM SCH APPLIC: 1.74; 4.06 OINTMENT TOPICAL at 13:16

## 2021-03-04 RX ADMIN — SPIRONOLACTONE SCH MG: 25 TABLET, FILM COATED ORAL at 08:46

## 2021-03-04 RX ADMIN — GABAPENTIN SCH MG: 100 CAPSULE ORAL at 20:32

## 2021-03-04 RX ADMIN — THERA TABS SCH UDTAB: TAB at 08:46

## 2021-03-04 RX ADMIN — Medication SCH DROP: at 01:36

## 2021-03-04 RX ADMIN — SIMETHICONE CHEW TAB 80 MG PRN MG: 80 TABLET ORAL at 16:03

## 2021-03-04 RX ADMIN — Medication SCH DROP: at 20:43

## 2021-03-04 RX ADMIN — Medication PRN MG: at 00:39

## 2021-03-04 RX ADMIN — VITAMIN D, TAB 1000IU (100/BT) SCH UNIT: 25 TAB at 08:46

## 2021-03-04 RX ADMIN — Medication SCH DROP: at 05:05

## 2021-03-04 RX ADMIN — CALCIUM SCH MG: 500 TABLET ORAL at 08:46

## 2021-03-04 RX ADMIN — ATORVASTATIN CALCIUM SCH MG: 10 TABLET, FILM COATED ORAL at 20:33

## 2021-03-04 RX ADMIN — Medication PRN MG: at 23:33

## 2021-03-04 RX ADMIN — Medication SCH DROP: at 13:16

## 2021-03-04 RX ADMIN — SIMETHICONE CHEW TAB 80 MG PRN MG: 80 TABLET ORAL at 08:19

## 2021-03-04 RX ADMIN — ENOXAPARIN SODIUM SCH MG: 40 INJECTION SUBCUTANEOUS at 08:46

## 2021-03-04 RX ADMIN — Medication SCH MG: at 08:35

## 2021-03-04 RX ADMIN — Medication SCH TAB: at 08:46

## 2021-03-04 RX ADMIN — FUROSEMIDE SCH MG: 20 TABLET ORAL at 08:34

## 2021-03-04 RX ADMIN — CALCIUM SCH MG: 500 TABLET ORAL at 16:06

## 2021-03-04 RX ADMIN — Medication SCH MG: at 20:33

## 2021-03-04 RX ADMIN — ANALGESIC BALM SCH APPLIC: 1.74; 4.06 OINTMENT TOPICAL at 16:06

## 2021-03-04 RX ADMIN — Medication SCH DROP: at 16:34

## 2021-03-04 RX ADMIN — NYSTATIN SCH GM: 100000 CREAM TOPICAL at 20:43

## 2021-03-04 RX ADMIN — Medication SCH DROP: at 08:38

## 2021-03-04 RX ADMIN — FAMOTIDINE SCH MG: 20 TABLET, FILM COATED ORAL at 08:35

## 2021-03-04 RX ADMIN — SENNOSIDES SCH TAB: 8.6 TABLET, COATED ORAL at 20:42

## 2021-03-04 RX ADMIN — ANALGESIC BALM SCH APPLIC: 1.74; 4.06 OINTMENT TOPICAL at 08:37

## 2021-03-04 NOTE — NUR
pt slept intermittently. Pt requested Ambien, administered PRN.  c/o of 10/10 pain in hip. 
Administered OXyir PRN. Repositioned pt for comfort. Applied SCD pumps. Bilateral heels 
offloaded. No acute distress noted. Safety measures maintained. Will continue plan of care.

## 2021-03-04 NOTE — NUR
PT IN CHAIR RESTING, AWAKE ALERT AND ORIENTED X4, ON 1L OF O2, SATURATING AT 98%, NO SIGNS 
OF DISTRESS OR PAIN AT THIS TIME. PT AMBULATES VIA WALKER, COMMODE AT BEDSIDE WITH ASSIST. 
BED IN LOW AND LOCKED POSITION, BED ALARM ON, SAFETY AND FALL PRECAUTIONS IN PLACE. WILL 
CONTINUE WITH PLAN OF CARE.

## 2021-03-04 NOTE — NUR
removed sutures from surgical site on the left hip, thigh, and knee. Each surgical site is 
well approximated, no drainage, dry, no redness, no signs of infection. All areas clean, 
steristrips applied, and covered.

## 2021-03-04 NOTE — NUR
pt sitting in chair watching tv, awake alert and oriented x4, pt on 1L O2 was needed 
saturating at 98%, no signs of distress noted, no reports of pain at this time. pt ambulates 
via walker, able to transfer self to bedside commode. last BM today 3/3/21. pt on cardiac 
diet able to swallow whole pills. Staples were removed from surgical sites, cleaned, 
steristrips applied and covered. all medications given as prescribed, all needs tended too 
this shift, bed in low and locked position, call light within reach, safety and fall 
precautions in place, will endorse to oncoming nurse.

## 2021-03-04 NOTE — NUR
Pt feels discomfort on bilateral feet due to edema. Pt requesting Lasix for tonight. 
Notified Dr. Plummer with new order for Lasix 40mg PO now. Will carry out order. Continue 
to monitor. 

-------------------------------------------------------------------------------

Addendum: 03/04/21 at 2243 by Tyler Reyes RN

-------------------------------------------------------------------------------

Bilateral feet elevated.

## 2021-03-04 NOTE — NUR
Received pt sitting in chair and watching tv. AAO x4. No acute distress noted. Denies pain/ 
discomfort. Pt refused Senokot, pt stated she already had 2 BM today. Pt also refused 
Nystatin cream, pt stated she didn't want that cream but wants something else. Risks and 
benefits explained, pt refused. Other due meds given as ordered. Safety measures maintained. 
Call light and personal items within reach. Will continue to monitor.

## 2021-03-05 VITALS — SYSTOLIC BLOOD PRESSURE: 111 MMHG | DIASTOLIC BLOOD PRESSURE: 64 MMHG

## 2021-03-05 VITALS — DIASTOLIC BLOOD PRESSURE: 53 MMHG | SYSTOLIC BLOOD PRESSURE: 130 MMHG

## 2021-03-05 VITALS — DIASTOLIC BLOOD PRESSURE: 70 MMHG | SYSTOLIC BLOOD PRESSURE: 126 MMHG

## 2021-03-05 VITALS — DIASTOLIC BLOOD PRESSURE: 43 MMHG | SYSTOLIC BLOOD PRESSURE: 125 MMHG

## 2021-03-05 RX ADMIN — Medication SCH MG: at 10:13

## 2021-03-05 RX ADMIN — NYSTATIN SCH GM: 100000 CREAM TOPICAL at 20:11

## 2021-03-05 RX ADMIN — Medication SCH MG: at 20:09

## 2021-03-05 RX ADMIN — DOCUSATE SODIUM SCH MG: 100 CAPSULE, LIQUID FILLED ORAL at 10:12

## 2021-03-05 RX ADMIN — ANALGESIC BALM SCH APPLIC: 1.74; 4.06 OINTMENT TOPICAL at 16:21

## 2021-03-05 RX ADMIN — Medication SCH DROP: at 16:22

## 2021-03-05 RX ADMIN — Medication PRN MG: at 13:07

## 2021-03-05 RX ADMIN — ASPIRIN SCH MG: 81 TABLET, COATED ORAL at 10:12

## 2021-03-05 RX ADMIN — CALCIUM SCH MG: 500 TABLET ORAL at 16:21

## 2021-03-05 RX ADMIN — ACETAMINOPHEN PRN MG: 325 TABLET ORAL at 16:21

## 2021-03-05 RX ADMIN — Medication PRN MG: at 06:33

## 2021-03-05 RX ADMIN — DOCUSATE SODIUM SCH MG: 100 CAPSULE, LIQUID FILLED ORAL at 16:21

## 2021-03-05 RX ADMIN — FUROSEMIDE SCH MG: 20 TABLET ORAL at 10:14

## 2021-03-05 RX ADMIN — ATORVASTATIN CALCIUM SCH MG: 10 TABLET, FILM COATED ORAL at 20:03

## 2021-03-05 RX ADMIN — ENOXAPARIN SODIUM SCH MG: 40 INJECTION SUBCUTANEOUS at 10:15

## 2021-03-05 RX ADMIN — Medication PRN MG: at 20:10

## 2021-03-05 RX ADMIN — SPIRONOLACTONE SCH MG: 25 TABLET, FILM COATED ORAL at 10:12

## 2021-03-05 RX ADMIN — Medication SCH DROP: at 10:16

## 2021-03-05 RX ADMIN — FAMOTIDINE SCH MG: 20 TABLET, FILM COATED ORAL at 09:00

## 2021-03-05 RX ADMIN — GABAPENTIN SCH MG: 100 CAPSULE ORAL at 20:09

## 2021-03-05 RX ADMIN — FAMOTIDINE SCH MG: 20 TABLET, FILM COATED ORAL at 10:13

## 2021-03-05 RX ADMIN — Medication SCH DROP: at 13:13

## 2021-03-05 RX ADMIN — FAMOTIDINE SCH MG: 20 TABLET, FILM COATED ORAL at 20:03

## 2021-03-05 RX ADMIN — Medication SCH DROP: at 02:02

## 2021-03-05 RX ADMIN — VITAMIN D, TAB 1000IU (100/BT) SCH UNIT: 25 TAB at 10:12

## 2021-03-05 RX ADMIN — SENNOSIDES SCH TAB: 8.6 TABLET, COATED ORAL at 20:11

## 2021-03-05 RX ADMIN — ANALGESIC BALM SCH APPLIC: 1.74; 4.06 OINTMENT TOPICAL at 10:15

## 2021-03-05 RX ADMIN — Medication SCH DROP: at 20:10

## 2021-03-05 RX ADMIN — SIMETHICONE CHEW TAB 80 MG PRN MG: 80 TABLET ORAL at 10:23

## 2021-03-05 RX ADMIN — THERA TABS SCH UDTAB: TAB at 10:14

## 2021-03-05 RX ADMIN — ANALGESIC BALM SCH APPLIC: 1.74; 4.06 OINTMENT TOPICAL at 13:12

## 2021-03-05 RX ADMIN — Medication SCH DROP: at 05:53

## 2021-03-05 RX ADMIN — Medication SCH TAB: at 10:12

## 2021-03-05 RX ADMIN — Medication PRN MG: at 10:23

## 2021-03-05 RX ADMIN — NYSTATIN SCH GM: 100000 CREAM TOPICAL at 10:14

## 2021-03-05 RX ADMIN — CALCIUM SCH MG: 500 TABLET ORAL at 10:12

## 2021-03-05 RX ADMIN — Medication SCH DROP: at 20:11

## 2021-03-06 VITALS — SYSTOLIC BLOOD PRESSURE: 186 MMHG | DIASTOLIC BLOOD PRESSURE: 69 MMHG

## 2021-03-06 VITALS — DIASTOLIC BLOOD PRESSURE: 45 MMHG | SYSTOLIC BLOOD PRESSURE: 108 MMHG

## 2021-03-06 VITALS — DIASTOLIC BLOOD PRESSURE: 58 MMHG | SYSTOLIC BLOOD PRESSURE: 98 MMHG

## 2021-03-06 VITALS — DIASTOLIC BLOOD PRESSURE: 64 MMHG | SYSTOLIC BLOOD PRESSURE: 124 MMHG

## 2021-03-06 VITALS — SYSTOLIC BLOOD PRESSURE: 105 MMHG | DIASTOLIC BLOOD PRESSURE: 52 MMHG

## 2021-03-06 LAB
ALP SERPL-CCNC: 138 U/L (ref 50–136)
ALT SERPL W/O P-5'-P-CCNC: 34 U/L (ref 14–59)
AST SERPL-CCNC: 22 U/L (ref 15–37)
BASOPHILS # BLD AUTO: 0 K/UL (ref 0–8)
BASOPHILS NFR BLD AUTO: 0.4 % (ref 0–2)
BILIRUB SERPL-MCNC: 0.4 MG/DL (ref 0.2–1)
BUN SERPL-MCNC: 30 MG/DL (ref 7–18)
CHLORIDE SERPL-SCNC: 100 MMOL/L (ref 98–107)
CO2 SERPL-SCNC: 31 MMOL/L (ref 21–32)
CREAT SERPL-MCNC: 1 MG/DL (ref 0.6–1.3)
EOSINOPHIL # BLD AUTO: 0.4 K/UL (ref 0–0.7)
EOSINOPHIL NFR BLD AUTO: 6.3 % (ref 0–7)
GLUCOSE SERPL-MCNC: 103 MG/DL (ref 74–106)
HCT VFR BLD AUTO: 30 % (ref 31.2–41.9)
HGB BLD-MCNC: 10.1 G/DL (ref 10.9–14.3)
LYMPHOCYTES # BLD AUTO: 1.4 K/UL (ref 20–40)
LYMPHOCYTES NFR BLD AUTO: 20.4 % (ref 20.5–51.5)
MCH RBC QN AUTO: 32.1 UUG (ref 24.7–32.8)
MCHC RBC AUTO-ENTMCNC: 34 G/DL (ref 32.3–35.6)
MCV RBC AUTO: 95.7 FL (ref 75.5–95.3)
MONOCYTES # BLD AUTO: 0.9 K/UL (ref 2–10)
MONOCYTES NFR BLD AUTO: 12.3 % (ref 0–11)
NEUTROPHILS # BLD AUTO: 4.2 K/UL (ref 1.8–8.9)
NEUTROPHILS NFR BLD AUTO: 60.6 % (ref 38.5–71.5)
PLATELET # BLD AUTO: 357 K/UL (ref 179–408)
POTASSIUM SERPL-SCNC: 3.8 MMOL/L (ref 3.5–5.1)
RBC # BLD AUTO: 3.14 MIL/UL (ref 3.63–4.92)
WBC # BLD AUTO: 7 K/UL (ref 3.8–11.8)
WS STN SPEC: 6.2 G/DL (ref 6.4–8.2)

## 2021-03-06 RX ADMIN — Medication PRN MG: at 20:30

## 2021-03-06 RX ADMIN — Medication SCH TAB: at 09:41

## 2021-03-06 RX ADMIN — FAMOTIDINE SCH MG: 20 TABLET, FILM COATED ORAL at 20:26

## 2021-03-06 RX ADMIN — Medication PRN MG: at 09:56

## 2021-03-06 RX ADMIN — CALCIUM SCH MG: 500 TABLET ORAL at 16:58

## 2021-03-06 RX ADMIN — Medication PRN MG: at 01:19

## 2021-03-06 RX ADMIN — DOCUSATE SODIUM SCH MG: 100 CAPSULE, LIQUID FILLED ORAL at 10:03

## 2021-03-06 RX ADMIN — ASPIRIN SCH MG: 81 TABLET, COATED ORAL at 09:40

## 2021-03-06 RX ADMIN — Medication SCH DROP: at 05:06

## 2021-03-06 RX ADMIN — ATORVASTATIN CALCIUM SCH MG: 10 TABLET, FILM COATED ORAL at 20:26

## 2021-03-06 RX ADMIN — NYSTATIN SCH GM: 100000 CREAM TOPICAL at 10:03

## 2021-03-06 RX ADMIN — Medication SCH MG: at 09:47

## 2021-03-06 RX ADMIN — THERA TABS SCH UDTAB: TAB at 09:48

## 2021-03-06 RX ADMIN — FUROSEMIDE SCH MG: 20 TABLET ORAL at 09:39

## 2021-03-06 RX ADMIN — GABAPENTIN SCH MG: 100 CAPSULE ORAL at 20:26

## 2021-03-06 RX ADMIN — CALCIUM SCH MG: 500 TABLET ORAL at 09:48

## 2021-03-06 RX ADMIN — NYSTATIN SCH APPLIC: 100000 CREAM TOPICAL at 20:31

## 2021-03-06 RX ADMIN — Medication SCH MG: at 20:26

## 2021-03-06 RX ADMIN — Medication SCH DROP: at 16:59

## 2021-03-06 RX ADMIN — SIMETHICONE CHEW TAB 80 MG PRN MG: 80 TABLET ORAL at 20:30

## 2021-03-06 RX ADMIN — SENNOSIDES SCH TAB: 8.6 TABLET, COATED ORAL at 23:49

## 2021-03-06 RX ADMIN — Medication PRN MG: at 05:58

## 2021-03-06 RX ADMIN — Medication SCH DROP: at 10:03

## 2021-03-06 RX ADMIN — Medication SCH DROP: at 13:14

## 2021-03-06 RX ADMIN — Medication SCH DROP: at 10:04

## 2021-03-06 RX ADMIN — Medication PRN MG: at 20:26

## 2021-03-06 RX ADMIN — SPIRONOLACTONE SCH MG: 25 TABLET, FILM COATED ORAL at 09:39

## 2021-03-06 RX ADMIN — Medication PRN MG: at 14:25

## 2021-03-06 RX ADMIN — SIMETHICONE CHEW TAB 80 MG PRN MG: 80 TABLET ORAL at 09:56

## 2021-03-06 RX ADMIN — ANALGESIC BALM SCH APPLIC: 1.74; 4.06 OINTMENT TOPICAL at 13:14

## 2021-03-06 RX ADMIN — ACETAMINOPHEN PRN MG: 325 TABLET ORAL at 23:50

## 2021-03-06 RX ADMIN — Medication SCH DROP: at 20:31

## 2021-03-06 RX ADMIN — ANALGESIC BALM SCH APPLIC: 1.74; 4.06 OINTMENT TOPICAL at 16:58

## 2021-03-06 RX ADMIN — ENOXAPARIN SODIUM SCH MG: 40 INJECTION SUBCUTANEOUS at 10:15

## 2021-03-06 RX ADMIN — ANALGESIC BALM SCH APPLIC: 1.74; 4.06 OINTMENT TOPICAL at 10:03

## 2021-03-06 RX ADMIN — VITAMIN D, TAB 1000IU (100/BT) SCH UNIT: 25 TAB at 09:48

## 2021-03-06 RX ADMIN — SENNOSIDES SCH TAB: 8.6 TABLET, COATED ORAL at 20:44

## 2021-03-06 RX ADMIN — FUROSEMIDE SCH MG: 40 TABLET ORAL at 14:24

## 2021-03-06 RX ADMIN — DOCUSATE SODIUM SCH MG: 100 CAPSULE, LIQUID FILLED ORAL at 16:57

## 2021-03-06 RX ADMIN — Medication SCH DROP: at 20:32

## 2021-03-06 RX ADMIN — Medication SCH DROP: at 03:30

## 2021-03-06 RX ADMIN — ACETAMINOPHEN PRN MG: 325 TABLET ORAL at 01:19

## 2021-03-06 NOTE — NUR
slept intermittently.  No acute distress noted. VSS. C/o of 8/10 pain in hip, administered 
OXY PRN x2. All due medication administered and tolerated well. Needs attended too promptly. 
Safety measures maintained. Call light and personal items within reach. Will continue to 
monitor. Safety measures and fall precautions in place. Bed locked, in low position, and 
alarm on. Will continue with plan of care.

## 2021-03-07 VITALS — DIASTOLIC BLOOD PRESSURE: 70 MMHG | SYSTOLIC BLOOD PRESSURE: 148 MMHG

## 2021-03-07 VITALS — SYSTOLIC BLOOD PRESSURE: 105 MMHG | DIASTOLIC BLOOD PRESSURE: 58 MMHG

## 2021-03-07 VITALS — DIASTOLIC BLOOD PRESSURE: 63 MMHG | SYSTOLIC BLOOD PRESSURE: 116 MMHG

## 2021-03-07 RX ADMIN — Medication PRN MG: at 21:21

## 2021-03-07 RX ADMIN — FUROSEMIDE SCH MG: 40 TABLET ORAL at 08:26

## 2021-03-07 RX ADMIN — SIMETHICONE CHEW TAB 80 MG PRN MG: 80 TABLET ORAL at 08:23

## 2021-03-07 RX ADMIN — ANALGESIC BALM SCH APPLIC: 1.74; 4.06 OINTMENT TOPICAL at 13:30

## 2021-03-07 RX ADMIN — ANALGESIC BALM SCH APPLIC: 1.74; 4.06 OINTMENT TOPICAL at 17:56

## 2021-03-07 RX ADMIN — Medication PRN MG: at 03:59

## 2021-03-07 RX ADMIN — Medication SCH DROP: at 17:56

## 2021-03-07 RX ADMIN — SENNOSIDES SCH TAB: 8.6 TABLET, COATED ORAL at 21:21

## 2021-03-07 RX ADMIN — Medication SCH DROP: at 21:23

## 2021-03-07 RX ADMIN — Medication SCH MG: at 08:25

## 2021-03-07 RX ADMIN — Medication SCH DROP: at 21:22

## 2021-03-07 RX ADMIN — CALCIUM SCH MG: 500 TABLET ORAL at 17:55

## 2021-03-07 RX ADMIN — Medication PRN MG: at 21:22

## 2021-03-07 RX ADMIN — NYSTATIN SCH APPLIC: 100000 CREAM TOPICAL at 08:36

## 2021-03-07 RX ADMIN — SIMETHICONE CHEW TAB 80 MG PRN MG: 80 TABLET ORAL at 21:21

## 2021-03-07 RX ADMIN — Medication SCH DROP: at 13:30

## 2021-03-07 RX ADMIN — ENOXAPARIN SODIUM SCH MG: 40 INJECTION SUBCUTANEOUS at 08:28

## 2021-03-07 RX ADMIN — SPIRONOLACTONE SCH MG: 25 TABLET, FILM COATED ORAL at 08:26

## 2021-03-07 RX ADMIN — Medication PRN MG: at 08:27

## 2021-03-07 RX ADMIN — Medication SCH DROP: at 08:37

## 2021-03-07 RX ADMIN — Medication PRN MG: at 13:00

## 2021-03-07 RX ADMIN — CALCIUM SCH MG: 500 TABLET ORAL at 08:26

## 2021-03-07 RX ADMIN — NYSTATIN SCH APPLIC: 100000 CREAM TOPICAL at 21:22

## 2021-03-07 RX ADMIN — DOCUSATE SODIUM SCH MG: 100 CAPSULE, LIQUID FILLED ORAL at 08:26

## 2021-03-07 RX ADMIN — ATORVASTATIN CALCIUM SCH MG: 10 TABLET, FILM COATED ORAL at 21:22

## 2021-03-07 RX ADMIN — FUROSEMIDE SCH MG: 40 TABLET ORAL at 16:00

## 2021-03-07 RX ADMIN — Medication SCH DROP: at 01:52

## 2021-03-07 RX ADMIN — Medication SCH DROP: at 06:16

## 2021-03-07 RX ADMIN — Medication SCH DROP: at 08:28

## 2021-03-07 RX ADMIN — VITAMIN D, TAB 1000IU (100/BT) SCH UNIT: 25 TAB at 08:23

## 2021-03-07 RX ADMIN — ASPIRIN SCH MG: 81 TABLET, COATED ORAL at 08:22

## 2021-03-07 RX ADMIN — GABAPENTIN SCH MG: 100 CAPSULE ORAL at 21:21

## 2021-03-07 RX ADMIN — FAMOTIDINE SCH MG: 20 TABLET, FILM COATED ORAL at 21:22

## 2021-03-07 RX ADMIN — Medication SCH TAB: at 08:22

## 2021-03-07 RX ADMIN — THERA TABS SCH UDTAB: TAB at 08:26

## 2021-03-07 RX ADMIN — Medication SCH MG: at 21:21

## 2021-03-07 RX ADMIN — ANALGESIC BALM SCH APPLIC: 1.74; 4.06 OINTMENT TOPICAL at 08:36

## 2021-03-07 RX ADMIN — DOCUSATE SODIUM SCH MG: 100 CAPSULE, LIQUID FILLED ORAL at 17:56

## 2021-03-07 NOTE — NUR
IN ROOM RESTING QUIETLY SITTING UP IN CHAIR. STATES HER ANKLE IS FEELS BETTER NO C/O PAIN OR 
DISTRESS NOTED. REFUSES XRAY
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
## 2021-03-07 NOTE — NUR
Received pt sitting in wheelchair watching TV. AAO x4. No acute distress noted. VSS. C/o of  
pain in hip 10/10, administered OXY 5mg PRN, effective. All due medication administered and 
tolerated well. Per pt request administered Bismuth and simethicone PRN. Needs attended too 
promptly. Safety measures maintained. Call light and personal items within reach. Will 
continue to monitor. Safety measures and fall precautions in place. Bed locked, in low 
position, and alarm on. Will continue with plan of care.

## 2021-03-08 VITALS — DIASTOLIC BLOOD PRESSURE: 61 MMHG | SYSTOLIC BLOOD PRESSURE: 127 MMHG

## 2021-03-08 VITALS — DIASTOLIC BLOOD PRESSURE: 56 MMHG | SYSTOLIC BLOOD PRESSURE: 127 MMHG

## 2021-03-08 VITALS — SYSTOLIC BLOOD PRESSURE: 109 MMHG | DIASTOLIC BLOOD PRESSURE: 47 MMHG

## 2021-03-08 VITALS — SYSTOLIC BLOOD PRESSURE: 109 MMHG | DIASTOLIC BLOOD PRESSURE: 51 MMHG

## 2021-03-08 RX ADMIN — Medication SCH MG: at 08:37

## 2021-03-08 RX ADMIN — Medication SCH TAB: at 08:38

## 2021-03-08 RX ADMIN — GABAPENTIN SCH MG: 100 CAPSULE ORAL at 20:32

## 2021-03-08 RX ADMIN — FUROSEMIDE SCH MG: 40 TABLET ORAL at 08:00

## 2021-03-08 RX ADMIN — Medication SCH DROP: at 01:56

## 2021-03-08 RX ADMIN — Medication SCH MG: at 20:32

## 2021-03-08 RX ADMIN — Medication PRN MG: at 23:42

## 2021-03-08 RX ADMIN — SPIRONOLACTONE SCH MG: 25 TABLET, FILM COATED ORAL at 08:37

## 2021-03-08 RX ADMIN — VITAMIN D, TAB 1000IU (100/BT) SCH UNIT: 25 TAB at 08:37

## 2021-03-08 RX ADMIN — Medication PRN MG: at 11:54

## 2021-03-08 RX ADMIN — NYSTATIN SCH APPLIC: 100000 CREAM TOPICAL at 20:40

## 2021-03-08 RX ADMIN — Medication PRN MG: at 01:55

## 2021-03-08 RX ADMIN — SENNOSIDES SCH TAB: 8.6 TABLET, COATED ORAL at 20:33

## 2021-03-08 RX ADMIN — ATORVASTATIN CALCIUM SCH MG: 10 TABLET, FILM COATED ORAL at 20:32

## 2021-03-08 RX ADMIN — ENOXAPARIN SODIUM SCH MG: 40 INJECTION SUBCUTANEOUS at 08:43

## 2021-03-08 RX ADMIN — FAMOTIDINE SCH MG: 20 TABLET, FILM COATED ORAL at 20:30

## 2021-03-08 RX ADMIN — Medication PRN MG: at 17:02

## 2021-03-08 RX ADMIN — Medication SCH DROP: at 20:40

## 2021-03-08 RX ADMIN — Medication SCH DROP: at 05:16

## 2021-03-08 RX ADMIN — CALCIUM SCH MG: 500 TABLET ORAL at 08:38

## 2021-03-08 RX ADMIN — ANALGESIC BALM SCH APPLIC: 1.74; 4.06 OINTMENT TOPICAL at 08:45

## 2021-03-08 RX ADMIN — ASPIRIN SCH MG: 81 TABLET, COATED ORAL at 08:37

## 2021-03-08 RX ADMIN — FUROSEMIDE SCH MG: 40 TABLET ORAL at 16:29

## 2021-03-08 RX ADMIN — ENOXAPARIN SODIUM SCH MG: 40 INJECTION SUBCUTANEOUS at 09:00

## 2021-03-08 RX ADMIN — Medication SCH DROP: at 14:03

## 2021-03-08 RX ADMIN — ANALGESIC BALM SCH APPLIC: 1.74; 4.06 OINTMENT TOPICAL at 16:41

## 2021-03-08 RX ADMIN — DOCUSATE SODIUM SCH MG: 100 CAPSULE, LIQUID FILLED ORAL at 16:41

## 2021-03-08 RX ADMIN — NYSTATIN SCH APPLIC: 100000 CREAM TOPICAL at 08:45

## 2021-03-08 RX ADMIN — GABAPENTIN SCH MG: 100 CAPSULE ORAL at 21:00

## 2021-03-08 RX ADMIN — FUROSEMIDE SCH MG: 40 TABLET ORAL at 08:38

## 2021-03-08 RX ADMIN — DOCUSATE SODIUM SCH MG: 100 CAPSULE, LIQUID FILLED ORAL at 08:37

## 2021-03-08 RX ADMIN — Medication SCH DROP: at 08:44

## 2021-03-08 RX ADMIN — THERA TABS SCH UDTAB: TAB at 08:37

## 2021-03-08 RX ADMIN — Medication SCH DROP: at 20:39

## 2021-03-08 RX ADMIN — CALCIUM SCH MG: 500 TABLET ORAL at 16:41

## 2021-03-08 RX ADMIN — ANALGESIC BALM SCH APPLIC: 1.74; 4.06 OINTMENT TOPICAL at 13:10

## 2021-03-08 RX ADMIN — Medication SCH DROP: at 09:50

## 2021-03-08 RX ADMIN — Medication SCH DROP: at 16:54

## 2021-03-08 NOTE — NUR
Pt is A&Ox4 able to make needs known, sitting in bed comfortable. On room air saturating at 
97%.  All due meds administered as per MD order , tolerated well no AR noted ,No other 
complaints, NO S/S of distress. All needs anticipated ,Safety measures in place, call light 
in reach. will continue with plan of care.

## 2021-03-08 NOTE — NUR
Pt left to eye doctor appointment by ambulance. gave report to EMT. pt on room air 
saturating at 98%. No S/S of distress noted

## 2021-03-08 NOTE — NUR
Pt is sitting in bed comfortable. On room air saturating at 97%. Pt is A&Ox4 able to make 
needs known, patient complained of itching and pain Prns were given as ordered as well as 
all scheduled medications. No other complaints, NO S/S of distress. All needs met. Safety 
measures in place, call light in reach. Will endorse to oncoming RN

## 2021-03-08 NOTE — NUR
Received pt sitting in chair no S/S of distress. On room air saturating well. no complaints 
of pain or discomfort. Bed in lowest position, call light in reach, safety measures in 
place. Will Continue to monitor.

## 2021-03-09 VITALS — SYSTOLIC BLOOD PRESSURE: 144 MMHG | DIASTOLIC BLOOD PRESSURE: 78 MMHG

## 2021-03-09 VITALS — DIASTOLIC BLOOD PRESSURE: 59 MMHG | SYSTOLIC BLOOD PRESSURE: 114 MMHG

## 2021-03-09 VITALS — DIASTOLIC BLOOD PRESSURE: 62 MMHG | SYSTOLIC BLOOD PRESSURE: 102 MMHG

## 2021-03-09 LAB
BASOPHILS # BLD AUTO: 0.1 K/UL (ref 0–8)
BASOPHILS NFR BLD AUTO: 2.1 % (ref 0–2)
BUN SERPL-MCNC: 25 MG/DL (ref 7–18)
CHLORIDE SERPL-SCNC: 101 MMOL/L (ref 98–107)
CO2 SERPL-SCNC: 31 MMOL/L (ref 21–32)
CREAT SERPL-MCNC: 0.9 MG/DL (ref 0.6–1.3)
EOSINOPHIL # BLD AUTO: 0.3 K/UL (ref 0–0.7)
EOSINOPHIL NFR BLD AUTO: 4.6 % (ref 0–7)
EOSINOPHIL NFR BLD MANUAL: 5 % (ref 0–8)
GLUCOSE SERPL-MCNC: 99 MG/DL (ref 74–106)
HCT VFR BLD AUTO: 30.3 % (ref 31.2–41.9)
HGB BLD-MCNC: 10.1 G/DL (ref 10.9–14.3)
LYMPHOCYTES # BLD AUTO: 1.7 K/UL (ref 20–40)
LYMPHOCYTES NFR BLD AUTO: 25.1 % (ref 20.5–51.5)
LYMPHOCYTES NFR BLD MANUAL: 23 % (ref 20–40)
MAGNESIUM SERPL-MCNC: 2.2 MG/DL (ref 1.8–2.4)
MCH RBC QN AUTO: 32 UUG (ref 24.7–32.8)
MCHC RBC AUTO-ENTMCNC: 33 G/DL (ref 32.3–35.6)
MCV RBC AUTO: 95.8 FL (ref 75.5–95.3)
MONOCYTES # BLD AUTO: 0.9 K/UL (ref 2–10)
MONOCYTES NFR BLD AUTO: 14 % (ref 0–11)
MONOCYTES NFR BLD MANUAL: 14 % (ref 2–10)
NEUTROPHILS # BLD AUTO: 3.7 K/UL (ref 1.8–8.9)
NEUTROPHILS NFR BLD AUTO: 54.2 % (ref 38.5–71.5)
NEUTS SEG NFR BLD MANUAL: 58 % (ref 42–75)
PHOSPHATE SERPL-MCNC: 4.3 MG/DL (ref 2.5–4.9)
PLATELET # BLD AUTO: 300 K/UL (ref 179–408)
POTASSIUM SERPL-SCNC: 4.1 MMOL/L (ref 3.5–5.1)
RBC # BLD AUTO: 3.16 MIL/UL (ref 3.63–4.92)
TSH SERPL DL<=0.005 MIU/L-ACNC: 5.21 MIU/ML (ref 0.36–3.74)
WBC # BLD AUTO: 6.7 K/UL (ref 3.8–11.8)

## 2021-03-09 RX ADMIN — FAMOTIDINE SCH MG: 20 TABLET, FILM COATED ORAL at 20:04

## 2021-03-09 RX ADMIN — VITAMIN D, TAB 1000IU (100/BT) SCH UNIT: 25 TAB at 09:03

## 2021-03-09 RX ADMIN — CALCIUM SCH MG: 500 TABLET ORAL at 17:00

## 2021-03-09 RX ADMIN — NYSTATIN SCH APPLIC: 100000 CREAM TOPICAL at 09:16

## 2021-03-09 RX ADMIN — ATORVASTATIN CALCIUM SCH MG: 10 TABLET, FILM COATED ORAL at 20:04

## 2021-03-09 RX ADMIN — NYSTATIN SCH APPLIC: 100000 CREAM TOPICAL at 21:16

## 2021-03-09 RX ADMIN — Medication SCH DROP: at 17:00

## 2021-03-09 RX ADMIN — DOCUSATE SODIUM SCH MG: 100 CAPSULE, LIQUID FILLED ORAL at 09:03

## 2021-03-09 RX ADMIN — FUROSEMIDE SCH MG: 40 TABLET ORAL at 09:03

## 2021-03-09 RX ADMIN — ACETAMINOPHEN PRN MG: 325 TABLET ORAL at 23:05

## 2021-03-09 RX ADMIN — ANALGESIC BALM SCH APPLIC: 1.74; 4.06 OINTMENT TOPICAL at 09:16

## 2021-03-09 RX ADMIN — ANALGESIC BALM SCH APPLIC: 1.74; 4.06 OINTMENT TOPICAL at 13:28

## 2021-03-09 RX ADMIN — Medication PRN MG: at 02:04

## 2021-03-09 RX ADMIN — SIMETHICONE CHEW TAB 80 MG PRN MG: 80 TABLET ORAL at 21:12

## 2021-03-09 RX ADMIN — Medication SCH DROP: at 05:28

## 2021-03-09 RX ADMIN — DOCUSATE SODIUM SCH MG: 100 CAPSULE, LIQUID FILLED ORAL at 16:58

## 2021-03-09 RX ADMIN — CALCIUM SCH MG: 500 TABLET ORAL at 09:02

## 2021-03-09 RX ADMIN — GABAPENTIN SCH MG: 100 CAPSULE ORAL at 20:19

## 2021-03-09 RX ADMIN — Medication SCH DROP: at 09:04

## 2021-03-09 RX ADMIN — Medication PRN MG: at 21:12

## 2021-03-09 RX ADMIN — Medication SCH DROP: at 21:27

## 2021-03-09 RX ADMIN — Medication SCH MG: at 09:03

## 2021-03-09 RX ADMIN — SPIRONOLACTONE SCH MG: 25 TABLET, FILM COATED ORAL at 09:03

## 2021-03-09 RX ADMIN — Medication PRN MG: at 14:50

## 2021-03-09 RX ADMIN — Medication PRN MG: at 09:26

## 2021-03-09 RX ADMIN — THERA TABS SCH UDTAB: TAB at 09:03

## 2021-03-09 RX ADMIN — ANALGESIC BALM SCH APPLIC: 1.74; 4.06 OINTMENT TOPICAL at 16:59

## 2021-03-09 RX ADMIN — ASPIRIN SCH MG: 81 TABLET, COATED ORAL at 09:03

## 2021-03-09 RX ADMIN — Medication SCH DROP: at 02:05

## 2021-03-09 RX ADMIN — SENNOSIDES SCH TAB: 8.6 TABLET, COATED ORAL at 20:05

## 2021-03-09 RX ADMIN — SIMETHICONE CHEW TAB 80 MG PRN MG: 80 TABLET ORAL at 09:27

## 2021-03-09 RX ADMIN — Medication SCH TAB: at 09:03

## 2021-03-09 RX ADMIN — Medication PRN MG: at 23:05

## 2021-03-09 RX ADMIN — ENOXAPARIN SODIUM SCH MG: 40 INJECTION SUBCUTANEOUS at 09:00

## 2021-03-09 RX ADMIN — Medication SCH DROP: at 11:26

## 2021-03-09 RX ADMIN — Medication SCH DROP: at 20:18

## 2021-03-09 RX ADMIN — FUROSEMIDE SCH MG: 40 TABLET ORAL at 16:58

## 2021-03-09 RX ADMIN — Medication PRN MG: at 20:08

## 2021-03-09 RX ADMIN — CALCIUM SCH MG: 500 TABLET ORAL at 09:00

## 2021-03-09 RX ADMIN — ENOXAPARIN SODIUM SCH MG: 40 INJECTION SUBCUTANEOUS at 09:07

## 2021-03-09 RX ADMIN — Medication SCH MG: at 21:11

## 2021-03-09 RX ADMIN — Medication SCH DROP: at 13:29

## 2021-03-09 RX ADMIN — ACETAMINOPHEN PRN MG: 325 TABLET ORAL at 02:04

## 2021-03-09 NOTE — NUR
Received sitting on the chair, alert and oriented, conversant. Denies chest pain or sob. 
Encouraged to ask for help when needed. Patient verbalized understanding. Safety measures in 
place. Call light within reach. Will continue to monitor.

## 2021-03-09 NOTE — NUR
Administered Ambien and Tylenol per pt request. Sept well throughout the night. No acute 
distress noted. Assisted pt to the bedside commode. Needs attended too. Safety measures 
maintained. SCD pump in place. Kept comfortable, multiple warm blankets provided. Will 
continue plan of care and endorse to oncoming shift.

## 2021-03-09 NOTE — NUR
Patient on the chair, alert and oriented. No respiratory distress. Denies pain. Assisted to 
commode. Kept comfortable. Call light within reach. Will continue to monitor.

## 2021-03-09 NOTE — NUR
Received pt in chair, alert and oriented, verbally responsive. Pt on room air, saturating at 
97%, no s/s of respiratory distress. No complaints of pain or discomfort at this time. 
Safety measures initiated, call light within reach. will continue to monitor.

## 2021-03-09 NOTE — NUR
Patient slept well all night no c/o pain or distress reported, all due meds administered and 
tolerated well. needs anticipated call light with in reach safety precautions observed all 
time.  Night  Report given to am nurse. Patient requested for rehab early in morning 8:00 am 
, continue with plan of care.

-------------------------------------------------------------------------------

Addendum: 03/09/21 at 0648 by JOSEPH ANGELES RN

-------------------------------------------------------------------------------

wrong pt

## 2021-03-10 VITALS — SYSTOLIC BLOOD PRESSURE: 107 MMHG | DIASTOLIC BLOOD PRESSURE: 72 MMHG

## 2021-03-10 VITALS — SYSTOLIC BLOOD PRESSURE: 113 MMHG | DIASTOLIC BLOOD PRESSURE: 71 MMHG

## 2021-03-10 VITALS — DIASTOLIC BLOOD PRESSURE: 72 MMHG | SYSTOLIC BLOOD PRESSURE: 126 MMHG

## 2021-03-10 VITALS — SYSTOLIC BLOOD PRESSURE: 115 MMHG | DIASTOLIC BLOOD PRESSURE: 53 MMHG

## 2021-03-10 RX ADMIN — FAMOTIDINE SCH MG: 20 TABLET, FILM COATED ORAL at 21:17

## 2021-03-10 RX ADMIN — ANALGESIC BALM SCH APPLIC: 1.74; 4.06 OINTMENT TOPICAL at 13:21

## 2021-03-10 RX ADMIN — Medication PRN MG: at 05:16

## 2021-03-10 RX ADMIN — DOCUSATE SODIUM SCH MG: 100 CAPSULE, LIQUID FILLED ORAL at 08:42

## 2021-03-10 RX ADMIN — GABAPENTIN SCH MG: 100 CAPSULE ORAL at 21:00

## 2021-03-10 RX ADMIN — Medication SCH DROP: at 08:49

## 2021-03-10 RX ADMIN — Medication SCH DROP: at 08:46

## 2021-03-10 RX ADMIN — Medication SCH DROP: at 18:07

## 2021-03-10 RX ADMIN — CALCIUM SCH MG: 500 TABLET ORAL at 17:00

## 2021-03-10 RX ADMIN — ACETAMINOPHEN PRN MG: 325 TABLET ORAL at 05:57

## 2021-03-10 RX ADMIN — Medication PRN MG: at 22:50

## 2021-03-10 RX ADMIN — Medication SCH DROP: at 13:27

## 2021-03-10 RX ADMIN — THERA TABS SCH UDTAB: TAB at 08:44

## 2021-03-10 RX ADMIN — ACETAMINOPHEN PRN MG: 325 TABLET ORAL at 21:17

## 2021-03-10 RX ADMIN — VITAMIN D, TAB 1000IU (100/BT) SCH UNIT: 25 TAB at 08:42

## 2021-03-10 RX ADMIN — Medication PRN MG: at 08:40

## 2021-03-10 RX ADMIN — FUROSEMIDE SCH MG: 40 TABLET ORAL at 08:44

## 2021-03-10 RX ADMIN — FAMOTIDINE SCH MG: 20 TABLET, FILM COATED ORAL at 15:30

## 2021-03-10 RX ADMIN — ASPIRIN SCH MG: 81 TABLET, COATED ORAL at 08:42

## 2021-03-10 RX ADMIN — Medication PRN MG: at 21:41

## 2021-03-10 RX ADMIN — Medication SCH DROP: at 01:50

## 2021-03-10 RX ADMIN — NYSTATIN SCH APPLIC: 100000 CREAM TOPICAL at 21:42

## 2021-03-10 RX ADMIN — ANALGESIC BALM SCH APPLIC: 1.74; 4.06 OINTMENT TOPICAL at 18:07

## 2021-03-10 RX ADMIN — SIMETHICONE CHEW TAB 80 MG PRN MG: 80 TABLET ORAL at 08:40

## 2021-03-10 RX ADMIN — Medication SCH TAB: at 08:42

## 2021-03-10 RX ADMIN — CALCIUM SCH MG: 500 TABLET ORAL at 08:48

## 2021-03-10 RX ADMIN — Medication SCH DROP: at 21:42

## 2021-03-10 RX ADMIN — Medication PRN MG: at 13:21

## 2021-03-10 RX ADMIN — SPIRONOLACTONE SCH MG: 25 TABLET, FILM COATED ORAL at 08:42

## 2021-03-10 RX ADMIN — Medication SCH DROP: at 21:41

## 2021-03-10 RX ADMIN — ENOXAPARIN SODIUM SCH MG: 40 INJECTION SUBCUTANEOUS at 08:50

## 2021-03-10 RX ADMIN — ANALGESIC BALM SCH APPLIC: 1.74; 4.06 OINTMENT TOPICAL at 08:47

## 2021-03-10 RX ADMIN — Medication SCH MG: at 21:17

## 2021-03-10 RX ADMIN — ATORVASTATIN CALCIUM SCH MG: 10 TABLET, FILM COATED ORAL at 21:17

## 2021-03-10 RX ADMIN — DOCUSATE SODIUM SCH MG: 100 CAPSULE, LIQUID FILLED ORAL at 18:07

## 2021-03-10 RX ADMIN — SIMETHICONE CHEW TAB 80 MG PRN MG: 80 TABLET ORAL at 21:41

## 2021-03-10 RX ADMIN — SENNOSIDES SCH TAB: 8.6 TABLET, COATED ORAL at 21:17

## 2021-03-10 RX ADMIN — Medication SCH DROP: at 05:34

## 2021-03-10 RX ADMIN — Medication SCH MG: at 08:43

## 2021-03-10 RX ADMIN — NYSTATIN SCH APPLIC: 100000 CREAM TOPICAL at 08:48

## 2021-03-10 NOTE — NUR
Patient is awake in bed, watching tv. No respiratory distress. Denies pain at this time. 
Kept comfortable. Call light kept within reach. Bed at lowest position and locked. Will 
continue to monitor.

## 2021-03-10 NOTE — NUR
Received pt distraught about discharge planning. Pt states " she was not well informed about 
discharge plan, nor given enough time to figure out which facility to go to." Pt refused to 
leave facility. Abdelrahman,  is aware that pt doesn't not want leave the hospital. 
Abdelrahman spoke with pt and addressed her concerns. F/U tomorrow about discharge plan. Pt 
states " she needs time to figure out what she wants to do/where she wants to go."

## 2021-03-10 NOTE — NUR
Dr. Daugherty with new order to changed Pepcid 10mg to q12 hrs per patient's request, noted and 
carried out.

## 2021-03-10 NOTE — NUR
Pt sitting on the chair, slept intermittently through the night, no s/s of distress, 
complained of pain this morning. PRN pain medication given as ordered.  Safety measures 
maintained, call light within reach. All needs attended.

## 2021-03-11 ENCOUNTER — HOSPITAL ENCOUNTER (INPATIENT)
Dept: HOSPITAL 12 - TELE3 | Age: 86
LOS: 2 days | Discharge: HOME | DRG: 292 | End: 2021-03-13
Attending: INTERNAL MEDICINE | Admitting: INTERNAL MEDICINE
Payer: MEDICARE

## 2021-03-11 VITALS — BODY MASS INDEX: 25.91 KG/M2 | WEIGHT: 132 LBS | HEIGHT: 60 IN

## 2021-03-11 VITALS — DIASTOLIC BLOOD PRESSURE: 59 MMHG | SYSTOLIC BLOOD PRESSURE: 146 MMHG

## 2021-03-11 VITALS — SYSTOLIC BLOOD PRESSURE: 140 MMHG | DIASTOLIC BLOOD PRESSURE: 70 MMHG

## 2021-03-11 DIAGNOSIS — Z88.8: ICD-10-CM

## 2021-03-11 DIAGNOSIS — H40.9: ICD-10-CM

## 2021-03-11 DIAGNOSIS — R10.9: ICD-10-CM

## 2021-03-11 DIAGNOSIS — G89.29: ICD-10-CM

## 2021-03-11 DIAGNOSIS — Z91.19: ICD-10-CM

## 2021-03-11 DIAGNOSIS — E78.5: ICD-10-CM

## 2021-03-11 DIAGNOSIS — I11.0: Primary | ICD-10-CM

## 2021-03-11 DIAGNOSIS — F32.9: ICD-10-CM

## 2021-03-11 DIAGNOSIS — I08.2: ICD-10-CM

## 2021-03-11 DIAGNOSIS — K21.9: ICD-10-CM

## 2021-03-11 DIAGNOSIS — N28.89: ICD-10-CM

## 2021-03-11 DIAGNOSIS — Z74.09: ICD-10-CM

## 2021-03-11 DIAGNOSIS — E03.9: ICD-10-CM

## 2021-03-11 DIAGNOSIS — G43.909: ICD-10-CM

## 2021-03-11 DIAGNOSIS — M79.7: ICD-10-CM

## 2021-03-11 DIAGNOSIS — I27.20: ICD-10-CM

## 2021-03-11 DIAGNOSIS — Z85.828: ICD-10-CM

## 2021-03-11 DIAGNOSIS — R19.5: ICD-10-CM

## 2021-03-11 DIAGNOSIS — J44.9: ICD-10-CM

## 2021-03-11 DIAGNOSIS — I25.10: ICD-10-CM

## 2021-03-11 DIAGNOSIS — I50.33: ICD-10-CM

## 2021-03-11 DIAGNOSIS — D64.9: ICD-10-CM

## 2021-03-11 DIAGNOSIS — F41.9: ICD-10-CM

## 2021-03-11 DIAGNOSIS — D68.69: ICD-10-CM

## 2021-03-11 DIAGNOSIS — M81.0: ICD-10-CM

## 2021-03-11 DIAGNOSIS — K57.30: ICD-10-CM

## 2021-03-11 DIAGNOSIS — R53.1: ICD-10-CM

## 2021-03-11 DIAGNOSIS — M85.80: ICD-10-CM

## 2021-03-11 DIAGNOSIS — I25.2: ICD-10-CM

## 2021-03-11 DIAGNOSIS — Z96.642: ICD-10-CM

## 2021-03-11 DIAGNOSIS — M19.90: ICD-10-CM

## 2021-03-11 LAB
BASOPHILS # BLD AUTO: 0.1 K/UL (ref 0–8)
BASOPHILS NFR BLD AUTO: 1.4 % (ref 0–2)
BUN SERPL-MCNC: 27 MG/DL (ref 7–18)
CHLORIDE SERPL-SCNC: 99 MMOL/L (ref 98–107)
CO2 SERPL-SCNC: 31 MMOL/L (ref 21–32)
CREAT SERPL-MCNC: 0.9 MG/DL (ref 0.6–1.3)
EOSINOPHIL # BLD AUTO: 0.2 K/UL (ref 0–0.7)
EOSINOPHIL NFR BLD AUTO: 2.7 % (ref 0–7)
GLUCOSE SERPL-MCNC: 99 MG/DL (ref 74–106)
HCT VFR BLD AUTO: 35.9 % (ref 31.2–41.9)
HGB BLD-MCNC: 10.8 G/DL (ref 10.9–14.3)
LYMPHOCYTES # BLD AUTO: 1.6 K/UL (ref 20–40)
LYMPHOCYTES NFR BLD AUTO: 25.6 % (ref 20.5–51.5)
MAGNESIUM SERPL-MCNC: 2.2 MG/DL (ref 1.8–2.4)
MCH RBC QN AUTO: 31.6 UUG (ref 24.7–32.8)
MCHC RBC AUTO-ENTMCNC: 30 G/DL (ref 32.3–35.6)
MCV RBC AUTO: 104.8 FL (ref 75.5–95.3)
MONOCYTES # BLD AUTO: 0.9 K/UL (ref 2–10)
MONOCYTES NFR BLD AUTO: 14.5 % (ref 0–11)
NEUTROPHILS # BLD AUTO: 3.4 K/UL (ref 1.8–8.9)
NEUTROPHILS NFR BLD AUTO: 55.8 % (ref 38.5–71.5)
PHOSPHATE SERPL-MCNC: 4 MG/DL (ref 2.5–4.9)
PLATELET # BLD AUTO: 292 K/UL (ref 179–408)
POTASSIUM SERPL-SCNC: 3.8 MMOL/L (ref 3.5–5.1)
RBC # BLD AUTO: 3.43 MIL/UL (ref 3.63–4.92)
WBC # BLD AUTO: 6.2 K/UL (ref 3.8–11.8)

## 2021-03-11 PROCEDURE — G0378 HOSPITAL OBSERVATION PER HR: HCPCS

## 2021-03-11 RX ADMIN — SIMETHICONE CHEW TAB 80 MG PRN MG: 80 TABLET ORAL at 23:07

## 2021-03-11 RX ADMIN — FUROSEMIDE SCH MG: 40 TABLET ORAL at 08:42

## 2021-03-11 RX ADMIN — ACETAMINOPHEN PRN MG: 325 TABLET ORAL at 06:00

## 2021-03-11 RX ADMIN — DOCUSATE SODIUM SCH MG: 100 CAPSULE, LIQUID FILLED ORAL at 08:44

## 2021-03-11 RX ADMIN — CALCIUM SCH MG: 500 TABLET ORAL at 08:42

## 2021-03-11 RX ADMIN — ACETAMINOPHEN PRN MG: 325 TABLET ORAL at 16:29

## 2021-03-11 RX ADMIN — FAMOTIDINE SCH MG: 20 TABLET, FILM COATED ORAL at 08:42

## 2021-03-11 RX ADMIN — Medication SCH DROP: at 08:43

## 2021-03-11 RX ADMIN — Medication PRN MG: at 22:56

## 2021-03-11 RX ADMIN — DOCUSATE SODIUM SCH MG: 100 CAPSULE, LIQUID FILLED ORAL at 16:24

## 2021-03-11 RX ADMIN — Medication PRN MG: at 03:51

## 2021-03-11 RX ADMIN — ANALGESIC BALM SCH APPLIC: 1.74; 4.06 OINTMENT TOPICAL at 08:44

## 2021-03-11 RX ADMIN — Medication SCH DROP: at 17:01

## 2021-03-11 RX ADMIN — FAMOTIDINE SCH MG: 20 TABLET, FILM COATED ORAL at 21:08

## 2021-03-11 RX ADMIN — Medication SCH TAB: at 08:41

## 2021-03-11 RX ADMIN — Medication SCH MG: at 08:42

## 2021-03-11 RX ADMIN — SIMETHICONE CHEW TAB 80 MG PRN MG: 80 TABLET ORAL at 06:04

## 2021-03-11 RX ADMIN — Medication SCH DROP: at 01:31

## 2021-03-11 RX ADMIN — Medication SCH DROP: at 08:45

## 2021-03-11 RX ADMIN — SPIRONOLACTONE SCH MG: 25 TABLET, FILM COATED ORAL at 08:42

## 2021-03-11 RX ADMIN — ANALGESIC BALM SCH APPLIC: 1.74; 4.06 OINTMENT TOPICAL at 12:12

## 2021-03-11 RX ADMIN — ATORVASTATIN CALCIUM SCH MG: 10 TABLET, FILM COATED ORAL at 21:00

## 2021-03-11 RX ADMIN — THERA TABS SCH UDTAB: TAB at 08:41

## 2021-03-11 RX ADMIN — Medication SCH DROP: at 13:27

## 2021-03-11 RX ADMIN — GABAPENTIN SCH MG: 100 CAPSULE ORAL at 21:00

## 2021-03-11 RX ADMIN — CALCIUM SCH MG: 500 TABLET ORAL at 16:24

## 2021-03-11 RX ADMIN — SIMETHICONE CHEW TAB 80 MG PRN MG: 80 TABLET ORAL at 16:24

## 2021-03-11 RX ADMIN — Medication PRN MG: at 01:20

## 2021-03-11 RX ADMIN — Medication PRN MG: at 16:23

## 2021-03-11 RX ADMIN — VITAMIN D, TAB 1000IU (100/BT) SCH UNIT: 25 TAB at 08:41

## 2021-03-11 RX ADMIN — ANALGESIC BALM SCH APPLIC: 1.74; 4.06 OINTMENT TOPICAL at 16:24

## 2021-03-11 RX ADMIN — ENOXAPARIN SODIUM SCH MG: 40 INJECTION SUBCUTANEOUS at 08:44

## 2021-03-11 RX ADMIN — ASPIRIN SCH MG: 81 TABLET, COATED ORAL at 08:41

## 2021-03-11 RX ADMIN — Medication PRN MG: at 09:14

## 2021-03-11 RX ADMIN — SIMETHICONE CHEW TAB 80 MG PRN MG: 80 TABLET ORAL at 09:14

## 2021-03-11 RX ADMIN — Medication SCH ML: at 20:59

## 2021-03-11 RX ADMIN — NYSTATIN SCH APPLIC: 100000 CREAM TOPICAL at 08:45

## 2021-03-11 RX ADMIN — Medication SCH DROP: at 06:04

## 2021-03-11 RX ADMIN — Medication PRN MG: at 17:41

## 2021-03-11 RX ADMIN — Medication PRN MG: at 23:08

## 2021-03-11 RX ADMIN — Medication SCH MG: at 21:02

## 2021-03-11 RX ADMIN — SENNOSIDES SCH TAB: 8.6 TABLET, COATED ORAL at 21:00

## 2021-03-11 NOTE — NUR
patient discharged to tele floor for sob per MD order, instructions given to patient, 
patient verbalized understanding of it.

-------------------------------------------------------------------------------

Addendum: 03/11/21 at 1816 by MELINA KO RN, RN

-------------------------------------------------------------------------------

patient refused belongings to be accounted, patient refused the pictures to be taken at 
surgery site

## 2021-03-11 NOTE — NUR
ADMITTED PATIENT ON THE TELE FLOOR UNIT UNDER THE CARE OF DR. CAMILO. PATIENT ALERT BUT 
FORGETFUL, NO SOB NO CHEST PAIN, NO ABDOMINAL PAIN NOTED. PATIENT TELE MONITOR SINUS RHYTHM, 
PATIENT REFUSED A COMPLETE BODY CHECK, SHOWN ALSO LEFT HIP INCISION SCAR FROM SP LEFT HIP 
ORIF. PATIENT HAS THREE INCISION SITE, TWO SITE WITH DRESSING REFUSED TO BE CHECK IF 
COMPLETELY HEALED, BUT ONE SITE IS DRIED, AND NO DRAINAGE. PATIENT REFUSED TO SHOW BUTTOCK 
AREA, SACRUM, AND LORENZO AREA. PATIENT REFUSED TO BE INSERTED IV ACCESS, PATIENT CAN BE 
COMBATIVE, AND HOSTILE TOWARDS STAFF. PATIENT SEATED AT FOOT OF THE BED, ENCOURAGE TO LAY 
DOWN ON THE BED, TO ELEVATE AND REST HER LEFT HIP, BUT REFUSED, WILL CONT TO MONITOR, CONT 
PAIN MANAGEMENT.

## 2021-03-11 NOTE — NUR
pt requesting oxy 5mg PRN for generalized body pain. Administered per pt request. Assisted 
pt back into bed. Applied multiple blankets. SCD pumps in place. Needs attended. Kept 
comfortable. Continue to monitor.

## 2021-03-11 NOTE — NUR
per pt request administered Ambien. Needs attended too. Pt resting in bed. Applied SCD 
pumps. No distress noted at this moment. Safety precautions maintained. Will continue plan 
of care

## 2021-03-12 VITALS — SYSTOLIC BLOOD PRESSURE: 100 MMHG | DIASTOLIC BLOOD PRESSURE: 47 MMHG

## 2021-03-12 VITALS — SYSTOLIC BLOOD PRESSURE: 139 MMHG | DIASTOLIC BLOOD PRESSURE: 68 MMHG

## 2021-03-12 VITALS — SYSTOLIC BLOOD PRESSURE: 136 MMHG | DIASTOLIC BLOOD PRESSURE: 54 MMHG

## 2021-03-12 VITALS — SYSTOLIC BLOOD PRESSURE: 120 MMHG | DIASTOLIC BLOOD PRESSURE: 60 MMHG

## 2021-03-12 VITALS — DIASTOLIC BLOOD PRESSURE: 60 MMHG | SYSTOLIC BLOOD PRESSURE: 111 MMHG

## 2021-03-12 LAB
ALP SERPL-CCNC: 137 U/L (ref 50–136)
ALT SERPL W/O P-5'-P-CCNC: 19 U/L (ref 14–59)
AST SERPL-CCNC: 21 U/L (ref 15–37)
BASOPHILS # BLD AUTO: 0.1 K/UL (ref 0–8)
BASOPHILS NFR BLD AUTO: 1.4 % (ref 0–2)
BILIRUB SERPL-MCNC: 0.7 MG/DL (ref 0.2–1)
BUN SERPL-MCNC: 27 MG/DL (ref 7–18)
CHLORIDE SERPL-SCNC: 99 MMOL/L (ref 98–107)
CO2 SERPL-SCNC: 31 MMOL/L (ref 21–32)
CREAT SERPL-MCNC: 1 MG/DL (ref 0.6–1.3)
EOSINOPHIL # BLD AUTO: 0.1 K/UL (ref 0–0.7)
EOSINOPHIL NFR BLD AUTO: 1.8 % (ref 0–7)
GLUCOSE SERPL-MCNC: 91 MG/DL (ref 74–106)
HCT VFR BLD AUTO: 30.7 % (ref 31.2–41.9)
HGB BLD-MCNC: 10.2 G/DL (ref 10.9–14.3)
LYMPHOCYTES # BLD AUTO: 1.6 K/UL (ref 20–40)
LYMPHOCYTES NFR BLD AUTO: 24.2 % (ref 20.5–51.5)
MAGNESIUM SERPL-MCNC: 2 MG/DL (ref 1.8–2.4)
MCH RBC QN AUTO: 31.5 UUG (ref 24.7–32.8)
MCHC RBC AUTO-ENTMCNC: 33 G/DL (ref 32.3–35.6)
MCV RBC AUTO: 95.2 FL (ref 75.5–95.3)
MONOCYTES # BLD AUTO: 0.9 K/UL (ref 2–10)
MONOCYTES NFR BLD AUTO: 14.1 % (ref 0–11)
NEUTROPHILS # BLD AUTO: 3.8 K/UL (ref 1.8–8.9)
NEUTROPHILS NFR BLD AUTO: 58.5 % (ref 38.5–71.5)
PHOSPHATE SERPL-MCNC: 4 MG/DL (ref 2.5–4.9)
PLATELET # BLD AUTO: 306 K/UL (ref 179–408)
POTASSIUM SERPL-SCNC: 3.7 MMOL/L (ref 3.5–5.1)
RBC # BLD AUTO: 3.22 MIL/UL (ref 3.63–4.92)
WBC # BLD AUTO: 6.4 K/UL (ref 3.8–11.8)
WS STN SPEC: 6.1 G/DL (ref 6.4–8.2)

## 2021-03-12 RX ADMIN — FAMOTIDINE SCH MG: 20 TABLET, FILM COATED ORAL at 08:33

## 2021-03-12 RX ADMIN — ATORVASTATIN CALCIUM SCH MG: 10 TABLET, FILM COATED ORAL at 20:58

## 2021-03-12 RX ADMIN — SENNOSIDES SCH TAB: 8.6 TABLET, COATED ORAL at 20:57

## 2021-03-12 RX ADMIN — Medication PRN MG: at 07:20

## 2021-03-12 RX ADMIN — FAMOTIDINE SCH MG: 20 TABLET, FILM COATED ORAL at 20:57

## 2021-03-12 RX ADMIN — Medication SCH ML: at 20:58

## 2021-03-12 RX ADMIN — Medication SCH TAB: at 08:49

## 2021-03-12 RX ADMIN — DOCUSATE SODIUM SCH MG: 100 CAPSULE, LIQUID FILLED ORAL at 08:49

## 2021-03-12 RX ADMIN — CALCIUM SCH MG: 500 TABLET ORAL at 08:52

## 2021-03-12 RX ADMIN — SIMETHICONE CHEW TAB 80 MG PRN MG: 80 TABLET ORAL at 07:20

## 2021-03-12 RX ADMIN — Medication SCH MG: at 20:58

## 2021-03-12 RX ADMIN — Medication PRN MG: at 22:00

## 2021-03-12 RX ADMIN — ACETAMINOPHEN PRN MG: 325 TABLET ORAL at 00:56

## 2021-03-12 RX ADMIN — ACETAMINOPHEN PRN MG: 325 TABLET ORAL at 21:07

## 2021-03-12 RX ADMIN — ASPIRIN SCH MG: 81 TABLET, CHEWABLE ORAL at 08:48

## 2021-03-12 RX ADMIN — Medication PRN MG: at 16:42

## 2021-03-12 RX ADMIN — Medication PRN MG: at 02:22

## 2021-03-12 RX ADMIN — VITAMIN D, TAB 1000IU (100/BT) SCH UNIT: 25 TAB at 08:49

## 2021-03-12 RX ADMIN — DOCUSATE SODIUM SCH MG: 100 CAPSULE, LIQUID FILLED ORAL at 16:36

## 2021-03-12 RX ADMIN — Medication SCH MG: at 08:49

## 2021-03-12 RX ADMIN — SPIRONOLACTONE SCH MG: 25 TABLET, FILM COATED ORAL at 08:49

## 2021-03-12 RX ADMIN — Medication SCH ML: at 08:50

## 2021-03-12 RX ADMIN — GABAPENTIN SCH MG: 100 CAPSULE ORAL at 21:00

## 2021-03-12 RX ADMIN — THERA TABS SCH UDTAB: TAB at 08:49

## 2021-03-12 RX ADMIN — FUROSEMIDE SCH MG: 20 TABLET ORAL at 08:48

## 2021-03-12 RX ADMIN — ENOXAPARIN SODIUM SCH MG: 40 INJECTION SUBCUTANEOUS at 08:49

## 2021-03-12 RX ADMIN — SIMETHICONE CHEW TAB 80 MG PRN MG: 80 TABLET ORAL at 16:42

## 2021-03-12 RX ADMIN — CALCIUM SCH MG: 500 TABLET ORAL at 16:36

## 2021-03-12 RX ADMIN — Medication PRN MG: at 16:39

## 2021-03-12 NOTE — NUR
Received patient sitting in chair. No s/s of acute distress noted at this time. Patient in 
RA denies SOB. C/o mild pain and will administer medication per order. Refusing IV access. 
Telemetry monitor in place, sinus rhythm. Patient refused skin check and to observed 
incision site. Safety measures in place, bed locked and in lowest position. Call light in 
reach and reinforced to call for assistance.

## 2021-03-13 VITALS — DIASTOLIC BLOOD PRESSURE: 68 MMHG | SYSTOLIC BLOOD PRESSURE: 122 MMHG

## 2021-03-13 VITALS — DIASTOLIC BLOOD PRESSURE: 58 MMHG | SYSTOLIC BLOOD PRESSURE: 129 MMHG

## 2021-03-13 VITALS — DIASTOLIC BLOOD PRESSURE: 67 MMHG | SYSTOLIC BLOOD PRESSURE: 123 MMHG

## 2021-03-13 VITALS — DIASTOLIC BLOOD PRESSURE: 62 MMHG | SYSTOLIC BLOOD PRESSURE: 131 MMHG

## 2021-03-13 RX ADMIN — ACETAMINOPHEN PRN MG: 325 TABLET ORAL at 05:06

## 2021-03-13 RX ADMIN — CALCIUM SCH MG: 500 TABLET ORAL at 08:49

## 2021-03-13 RX ADMIN — Medication SCH DROP: at 09:37

## 2021-03-13 RX ADMIN — Medication PRN MG: at 16:29

## 2021-03-13 RX ADMIN — Medication SCH DROP: at 09:40

## 2021-03-13 RX ADMIN — DOCUSATE SODIUM SCH MG: 100 CAPSULE, LIQUID FILLED ORAL at 08:45

## 2021-03-13 RX ADMIN — SIMETHICONE CHEW TAB 80 MG PRN MG: 80 TABLET ORAL at 05:26

## 2021-03-13 RX ADMIN — THERA TABS SCH UDTAB: TAB at 08:45

## 2021-03-13 RX ADMIN — Medication PRN MG: at 08:15

## 2021-03-13 RX ADMIN — Medication SCH DROP: at 14:37

## 2021-03-13 RX ADMIN — Medication PRN MG: at 04:25

## 2021-03-13 RX ADMIN — VITAMIN D, TAB 1000IU (100/BT) SCH UNIT: 25 TAB at 08:45

## 2021-03-13 RX ADMIN — ENOXAPARIN SODIUM SCH MG: 40 INJECTION SUBCUTANEOUS at 08:49

## 2021-03-13 RX ADMIN — ASPIRIN SCH MG: 81 TABLET, CHEWABLE ORAL at 08:45

## 2021-03-13 RX ADMIN — Medication SCH TAB: at 08:45

## 2021-03-13 RX ADMIN — FAMOTIDINE SCH MG: 20 TABLET, FILM COATED ORAL at 08:15

## 2021-03-13 RX ADMIN — SPIRONOLACTONE SCH MG: 25 TABLET, FILM COATED ORAL at 08:45

## 2021-03-13 RX ADMIN — Medication SCH MG: at 08:45

## 2021-03-13 RX ADMIN — FUROSEMIDE SCH MG: 20 TABLET ORAL at 08:45

## 2021-03-13 NOTE — NUR
Patient stable throughout shift. No s/s of acute distress noted at this time. Pt on RA 
denies SOB. C/o abdominal and gas pain, medication administered per order. Patient is goal 
oriented to be discharged, will endorse to oncoming staff. All needs were met and attended 
to. Safety measures remain intact.

## 2021-03-13 NOTE — NUR
Pt discharged home to [facility] [place] via [facility]. Pt left with all belongings 
and paperwork, pt refused to give us preferred pharmacy information, pt refused to give us 
sisters address to look up a pharmacy close by. MD willing to write physical prescriptions 
for pt but she wanted a list of medications instead so that her PCP could prescribe them 
instead. Pt insisted she needs a walker to take home, central supply brought up new walker 
and Physical Therapy assembled it and adjusted it to her height, after trying it out, pt 
refused to take it as she mentioned that it was too heavy. Pt also refused to speak with 
case management. MD made aware of the situation. Pt also refused to take off two hospital 
gowns and pants, she said "Its the least they can do since I spent over $10,000 staying at 
this hospital". Pt was uncooperative, upset, and unwilling to receive help being offered. pt 
left in stable condition, escorted down in wheelchair with belongings in another wheelchair, 
as she insisted we not carry them. Vitals: temp 98.0, pulse 66, resp rate 19, bp 131/62, O2 
saturation 97% on room air.

## 2021-03-13 NOTE — NUR
received change of shift report on pt, awake alert and orientedx4, pt ambulatory able to 
walk to bedside commode via walker, steady gait. pt on room air, no complaints of pain , no 
signs of distress noted at this time. NSR on tele monitor, bed in low and locked position, 
call light within reach will continue with plan of care.

## 2023-01-20 ENCOUNTER — HOSPITAL ENCOUNTER (INPATIENT)
Dept: HOSPITAL 12 - ER | Age: 88
LOS: 3 days | Discharge: NURSING, REHAB OR LONG-TERM CARE | DRG: 544 | End: 2023-01-23
Payer: MEDICARE

## 2023-01-20 VITALS — WEIGHT: 120 LBS | HEIGHT: 60 IN | BODY MASS INDEX: 23.56 KG/M2

## 2023-01-20 VITALS — SYSTOLIC BLOOD PRESSURE: 153 MMHG | DIASTOLIC BLOOD PRESSURE: 88 MMHG

## 2023-01-20 VITALS — SYSTOLIC BLOOD PRESSURE: 168 MMHG | DIASTOLIC BLOOD PRESSURE: 70 MMHG

## 2023-01-20 DIAGNOSIS — Z91.81: ICD-10-CM

## 2023-01-20 DIAGNOSIS — M41.9: ICD-10-CM

## 2023-01-20 DIAGNOSIS — M81.0: ICD-10-CM

## 2023-01-20 DIAGNOSIS — G89.4: ICD-10-CM

## 2023-01-20 DIAGNOSIS — E03.9: ICD-10-CM

## 2023-01-20 DIAGNOSIS — F41.9: ICD-10-CM

## 2023-01-20 DIAGNOSIS — J44.9: ICD-10-CM

## 2023-01-20 DIAGNOSIS — G89.11: ICD-10-CM

## 2023-01-20 DIAGNOSIS — M19.90: ICD-10-CM

## 2023-01-20 DIAGNOSIS — I10: ICD-10-CM

## 2023-01-20 DIAGNOSIS — Z96.642: ICD-10-CM

## 2023-01-20 DIAGNOSIS — M79.7: ICD-10-CM

## 2023-01-20 DIAGNOSIS — E78.5: ICD-10-CM

## 2023-01-20 DIAGNOSIS — M80.08XA: Primary | ICD-10-CM

## 2023-01-20 LAB
BUN SERPL-MCNC: 14 MG/DL (ref 7–18)
CHLORIDE SERPL-SCNC: 100 MMOL/L (ref 98–107)
CO2 SERPL-SCNC: 34 MMOL/L (ref 21–32)
CREAT SERPL-MCNC: 0.9 MG/DL (ref 0.6–1.3)
GLUCOSE SERPL-MCNC: 99 MG/DL (ref 74–106)
HCT VFR BLD AUTO: 43.4 % (ref 31.2–41.9)
MCH RBC QN AUTO: 30.5 UUG (ref 24.7–32.8)
MCV RBC AUTO: 92.2 FL (ref 75.5–95.3)
PLATELET # BLD AUTO: 259 K/UL (ref 179–408)
POTASSIUM SERPL-SCNC: 3.8 MMOL/L (ref 3.5–5.1)

## 2023-01-20 PROCEDURE — A4663 DIALYSIS BLOOD PRESSURE CUFF: HCPCS

## 2023-01-20 PROCEDURE — G0378 HOSPITAL OBSERVATION PER HR: HCPCS

## 2023-01-20 RX ADMIN — LOSARTAN POTASSIUM SCH MG: 50 TABLET, FILM COATED ORAL at 20:01

## 2023-01-20 RX ADMIN — HYDROCODONE BITARTRATE AND ACETAMINOPHEN PRN TAB: 5; 325 TABLET ORAL at 22:05

## 2023-01-20 RX ADMIN — ATORVASTATIN CALCIUM SCH MG: 20 TABLET, FILM COATED ORAL at 20:01

## 2023-01-20 RX ADMIN — Medication SCH MG: at 20:01

## 2023-01-20 NOTE — NUR
87 year old female received from er via wheel chair to room 317.for back pain .pt is [id number] 
.call light with in reach vs are stable

## 2023-01-20 NOTE — NUR
Pt in stable condition. AOx3. Transported pt to [facility]urg, [facility], via 
wheelchair. Received by nurse Bowers.

## 2023-01-20 NOTE — NUR
Pt will be admitted under the medical care of Addi Begum NP. Med-Surg 
unit, Rm 307. Report given to nurse Bowers.

## 2023-01-21 VITALS — DIASTOLIC BLOOD PRESSURE: 79 MMHG | SYSTOLIC BLOOD PRESSURE: 161 MMHG

## 2023-01-21 VITALS — DIASTOLIC BLOOD PRESSURE: 72 MMHG | SYSTOLIC BLOOD PRESSURE: 143 MMHG

## 2023-01-21 VITALS — DIASTOLIC BLOOD PRESSURE: 67 MMHG | SYSTOLIC BLOOD PRESSURE: 142 MMHG

## 2023-01-21 VITALS — SYSTOLIC BLOOD PRESSURE: 146 MMHG | DIASTOLIC BLOOD PRESSURE: 60 MMHG

## 2023-01-21 VITALS — SYSTOLIC BLOOD PRESSURE: 125 MMHG | DIASTOLIC BLOOD PRESSURE: 62 MMHG

## 2023-01-21 VITALS — SYSTOLIC BLOOD PRESSURE: 128 MMHG | DIASTOLIC BLOOD PRESSURE: 73 MMHG

## 2023-01-21 RX ADMIN — Medication SCH EACH: at 09:07

## 2023-01-21 RX ADMIN — LOSARTAN POTASSIUM SCH MG: 50 TABLET, FILM COATED ORAL at 09:08

## 2023-01-21 RX ADMIN — LOSARTAN POTASSIUM SCH MG: 50 TABLET, FILM COATED ORAL at 20:17

## 2023-01-21 RX ADMIN — Medication SCH MG: at 09:07

## 2023-01-21 RX ADMIN — ATORVASTATIN CALCIUM SCH MG: 20 TABLET, FILM COATED ORAL at 20:17

## 2023-01-21 RX ADMIN — Medication SCH DROP: at 20:16

## 2023-01-21 RX ADMIN — ACETAMINOPHEN PRN MG: 325 TABLET ORAL at 22:25

## 2023-01-21 RX ADMIN — HYDROCODONE BITARTRATE AND ACETAMINOPHEN PRN TAB: 5; 325 TABLET ORAL at 09:10

## 2023-01-21 RX ADMIN — Medication SCH ML: at 10:03

## 2023-01-21 RX ADMIN — Medication SCH MG: at 20:17

## 2023-01-21 RX ADMIN — ALPRAZOLAM PRN MG: 0.5 TABLET ORAL at 22:25

## 2023-01-21 RX ADMIN — CALCIUM CARBONATE-CHOLECALCIFEROL TAB 250 MG-125 UNIT SCH UDTAB: 250-125 TAB at 09:10

## 2023-01-21 RX ADMIN — FAMOTIDINE SCH MG: 20 TABLET, FILM COATED ORAL at 09:07

## 2023-01-21 NOTE — NUR
nsg:  Received 87 year old female lying in bed.alert and oriented x4. no c/o pain at this 
time. pt admitted for back pain call light with in reach vs are stable

## 2023-01-22 VITALS — DIASTOLIC BLOOD PRESSURE: 69 MMHG | SYSTOLIC BLOOD PRESSURE: 142 MMHG

## 2023-01-22 VITALS — DIASTOLIC BLOOD PRESSURE: 56 MMHG | SYSTOLIC BLOOD PRESSURE: 126 MMHG

## 2023-01-22 VITALS — SYSTOLIC BLOOD PRESSURE: 155 MMHG | DIASTOLIC BLOOD PRESSURE: 79 MMHG

## 2023-01-22 VITALS — SYSTOLIC BLOOD PRESSURE: 148 MMHG | DIASTOLIC BLOOD PRESSURE: 71 MMHG

## 2023-01-22 RX ADMIN — Medication SCH MG: at 21:06

## 2023-01-22 RX ADMIN — HYDROCODONE BITARTRATE AND ACETAMINOPHEN PRN TAB: 5; 325 TABLET ORAL at 06:07

## 2023-01-22 RX ADMIN — HYDROCODONE BITARTRATE AND ACETAMINOPHEN PRN TAB: 5; 325 TABLET ORAL at 22:42

## 2023-01-22 RX ADMIN — Medication SCH DROP: at 21:06

## 2023-01-22 RX ADMIN — CALCIUM CARBONATE-CHOLECALCIFEROL TAB 250 MG-125 UNIT SCH UDTAB: 250-125 TAB at 08:18

## 2023-01-22 RX ADMIN — ALPRAZOLAM PRN MG: 0.5 TABLET ORAL at 22:41

## 2023-01-22 RX ADMIN — ACETAMINOPHEN PRN MG: 325 TABLET ORAL at 11:41

## 2023-01-22 RX ADMIN — Medication SCH EACH: at 08:05

## 2023-01-22 RX ADMIN — Medication SCH DROP: at 08:19

## 2023-01-22 RX ADMIN — LOSARTAN POTASSIUM SCH MG: 50 TABLET, FILM COATED ORAL at 08:05

## 2023-01-22 RX ADMIN — LOSARTAN POTASSIUM SCH MG: 50 TABLET, FILM COATED ORAL at 21:05

## 2023-01-22 RX ADMIN — HYDROCODONE BITARTRATE AND ACETAMINOPHEN PRN TAB: 5; 325 TABLET ORAL at 14:34

## 2023-01-22 RX ADMIN — FAMOTIDINE SCH MG: 20 TABLET, FILM COATED ORAL at 08:05

## 2023-01-22 RX ADMIN — Medication SCH MG: at 08:05

## 2023-01-22 RX ADMIN — ATORVASTATIN CALCIUM SCH MG: 20 TABLET, FILM COATED ORAL at 21:06

## 2023-01-22 NOTE — NUR
Received Pt from Day shift. Pt is Alert but confused. Pt is cooperative. Pt on Rm air. No 
S&S of distress. Pt has no IV site. Safety measures in place. Will continue to monitor.

## 2023-01-22 NOTE — NUR
pt is refusing to dc to [facility]  [facility] asking if she can go to different place and saying she 
have to talk to her dr first md made aware

## 2023-01-23 ENCOUNTER — HOSPITAL ENCOUNTER (INPATIENT)
Dept: HOSPITAL 12 - REHABOV3 | Age: 88
LOS: 14 days | Discharge: HOME HEALTH | DRG: 560 | End: 2023-02-06
Attending: PHYSICAL MEDICINE & REHABILITATION | Admitting: PHYSICAL MEDICINE & REHABILITATION
Payer: MEDICARE

## 2023-01-23 VITALS — SYSTOLIC BLOOD PRESSURE: 155 MMHG | DIASTOLIC BLOOD PRESSURE: 77 MMHG

## 2023-01-23 VITALS — SYSTOLIC BLOOD PRESSURE: 158 MMHG | DIASTOLIC BLOOD PRESSURE: 86 MMHG

## 2023-01-23 VITALS — HEIGHT: 60 IN | WEIGHT: 134 LBS | BODY MASS INDEX: 26.31 KG/M2

## 2023-01-23 VITALS — SYSTOLIC BLOOD PRESSURE: 140 MMHG | DIASTOLIC BLOOD PRESSURE: 78 MMHG

## 2023-01-23 VITALS — SYSTOLIC BLOOD PRESSURE: 150 MMHG | DIASTOLIC BLOOD PRESSURE: 62 MMHG

## 2023-01-23 VITALS — DIASTOLIC BLOOD PRESSURE: 75 MMHG | SYSTOLIC BLOOD PRESSURE: 144 MMHG

## 2023-01-23 DIAGNOSIS — R29.6: ICD-10-CM

## 2023-01-23 DIAGNOSIS — M19.90: ICD-10-CM

## 2023-01-23 DIAGNOSIS — M80.08XD: Primary | ICD-10-CM

## 2023-01-23 DIAGNOSIS — F41.9: ICD-10-CM

## 2023-01-23 DIAGNOSIS — Z91.041: ICD-10-CM

## 2023-01-23 DIAGNOSIS — E03.9: ICD-10-CM

## 2023-01-23 DIAGNOSIS — E78.5: ICD-10-CM

## 2023-01-23 DIAGNOSIS — Z82.49: ICD-10-CM

## 2023-01-23 DIAGNOSIS — K21.9: ICD-10-CM

## 2023-01-23 DIAGNOSIS — Z91.81: ICD-10-CM

## 2023-01-23 DIAGNOSIS — F32.A: ICD-10-CM

## 2023-01-23 DIAGNOSIS — M79.7: ICD-10-CM

## 2023-01-23 DIAGNOSIS — G89.29: ICD-10-CM

## 2023-01-23 DIAGNOSIS — Z87.310: ICD-10-CM

## 2023-01-23 DIAGNOSIS — D68.59: ICD-10-CM

## 2023-01-23 DIAGNOSIS — Z88.8: ICD-10-CM

## 2023-01-23 DIAGNOSIS — I25.10: ICD-10-CM

## 2023-01-23 DIAGNOSIS — I11.9: ICD-10-CM

## 2023-01-23 DIAGNOSIS — K57.30: ICD-10-CM

## 2023-01-23 DIAGNOSIS — R53.1: ICD-10-CM

## 2023-01-23 LAB
APPEARANCE UR: CLEAR
BILIRUB UR QL STRIP: NEGATIVE
BUN SERPL-MCNC: 25 MG/DL (ref 7–18)
CHLORIDE SERPL-SCNC: 100 MMOL/L (ref 98–107)
CHOLEST SERPL-MCNC: 187 MG/DL (ref ?–200)
CO2 SERPL-SCNC: 31 MMOL/L (ref 21–32)
COLOR UR: YELLOW
CREAT SERPL-MCNC: 0.9 MG/DL (ref 0.6–1.3)
DEPRECATED SQUAMOUS URNS QL MICRO: (no result) /HPF
GLUCOSE SERPL-MCNC: 94 MG/DL (ref 74–106)
GLUCOSE UR STRIP-MCNC: NEGATIVE MG/DL
HCT VFR BLD AUTO: 39.2 % (ref 31.2–41.9)
HDLC SERPL-MCNC: 83 MG/DL (ref 40–60)
HGB UR QL STRIP: (no result)
KETONES UR STRIP-MCNC: NEGATIVE MG/DL
LEUKOCYTE ESTERASE UR QL STRIP: (no result)
MAGNESIUM SERPL-MCNC: 2.1 MG/DL (ref 1.8–2.4)
MCH RBC QN AUTO: 30.4 UUG (ref 24.7–32.8)
MCV RBC AUTO: 91.5 FL (ref 75.5–95.3)
NITRITE UR QL STRIP: NEGATIVE
PH UR STRIP: 7 [PH] (ref 5–8)
PHOSPHATE SERPL-MCNC: 4.5 MG/DL (ref 2.5–4.9)
PLATELET # BLD AUTO: 237 K/UL (ref 179–408)
POTASSIUM SERPL-SCNC: 4.1 MMOL/L (ref 3.5–5.1)
RBC #/AREA URNS HPF: (no result) /HPF (ref 0–3)
SP GR UR STRIP: 1.01 (ref 1–1.03)
TRIGL SERPL-MCNC: 78 MG/DL (ref 30–150)
UROBILINOGEN UR STRIP-MCNC: 0.2 E.U./DL
WBC #/AREA URNS HPF: (no result) /HPF
WBC #/AREA URNS HPF: (no result) /HPF (ref 0–3)

## 2023-01-23 PROCEDURE — A9150 MISC/EXPER NON-PRESCRIPT DRU: HCPCS

## 2023-01-23 RX ADMIN — Medication SCH MG: at 08:14

## 2023-01-23 RX ADMIN — Medication SCH EACH: at 08:14

## 2023-01-23 RX ADMIN — FAMOTIDINE SCH MG: 20 TABLET, FILM COATED ORAL at 08:14

## 2023-01-23 RX ADMIN — ANORECTAL OINTMENT SCH GM: 15.7; .44; 24; 20.6 OINTMENT TOPICAL at 21:27

## 2023-01-23 RX ADMIN — LOSARTAN POTASSIUM SCH MG: 50 TABLET, FILM COATED ORAL at 08:14

## 2023-01-23 RX ADMIN — CALCIUM CARBONATE-CHOLECALCIFEROL TAB 250 MG-125 UNIT SCH UDTAB: 250-125 TAB at 08:14

## 2023-01-23 RX ADMIN — Medication SCH DROP: at 08:14

## 2023-01-23 RX ADMIN — ACETAMINOPHEN PRN MG: 325 TABLET ORAL at 03:31

## 2023-01-23 NOTE — NUR
Admission report received from AM nurse. Received patient awake, alert and oriented x 3. 
Able to make needs known. Not in distress, pleasant, calm and cooperative. Continue routine 
admission process. Ambulatory with hand held assist to the bathroom in slow gait. Denies any 
pain at this time. Vs taken and recorded.

## 2023-01-23 NOTE — NUR
dc orders received noted and carried out,dc instruction given to the pt.dc pt to aru via bed 
in stable condition

## 2023-01-23 NOTE — NUR
87 YEAR OLD FEMALE RECEIVED FROM MS TO ROOM 307 FOR BACK PAIN .PT IS AXOX3,VS ARE STABLE 
CALL LIGHT WITH IN REACH MD NOTIFIED FOR THE ADMISSION ORDERS

## 2023-01-24 VITALS — DIASTOLIC BLOOD PRESSURE: 81 MMHG | SYSTOLIC BLOOD PRESSURE: 162 MMHG

## 2023-01-24 VITALS — SYSTOLIC BLOOD PRESSURE: 161 MMHG | DIASTOLIC BLOOD PRESSURE: 83 MMHG

## 2023-01-24 VITALS — DIASTOLIC BLOOD PRESSURE: 71 MMHG | SYSTOLIC BLOOD PRESSURE: 131 MMHG

## 2023-01-24 VITALS — DIASTOLIC BLOOD PRESSURE: 69 MMHG | SYSTOLIC BLOOD PRESSURE: 135 MMHG

## 2023-01-24 LAB — 1,25(OH)2D3 SERPL-MCNC: 33.6 PG/ML (ref 24.8–81.5)

## 2023-01-24 RX ADMIN — OYSTER SHELL CALCIUM WITH VITAMIN D SCH UDTAB: 500; 200 TABLET, FILM COATED ORAL at 20:51

## 2023-01-24 RX ADMIN — HYDROCODONE BITARTRATE AND ACETAMINOPHEN PRN TAB: 5; 325 TABLET ORAL at 00:56

## 2023-01-24 RX ADMIN — DOCUSATE SODIUM SCH MG: 100 CAPSULE, LIQUID FILLED ORAL at 20:50

## 2023-01-24 RX ADMIN — MUPIROCIN SCH GM: 20 OINTMENT TOPICAL at 17:00

## 2023-01-24 RX ADMIN — ATORVASTATIN CALCIUM SCH MG: 20 TABLET, FILM COATED ORAL at 20:51

## 2023-01-24 RX ADMIN — Medication SCH MG: at 20:56

## 2023-01-24 RX ADMIN — Medication SCH MG: at 20:58

## 2023-01-24 RX ADMIN — LOSARTAN POTASSIUM SCH MG: 50 TABLET, FILM COATED ORAL at 09:38

## 2023-01-24 RX ADMIN — FAMOTIDINE SCH MG: 20 TABLET, FILM COATED ORAL at 09:38

## 2023-01-24 RX ADMIN — LIDOCAINE SCH PATCH: 50 PATCH CUTANEOUS at 09:39

## 2023-01-24 RX ADMIN — ANORECTAL OINTMENT SCH APPLIC: 15.7; .44; 24; 20.6 OINTMENT TOPICAL at 20:56

## 2023-01-24 RX ADMIN — LOSARTAN POTASSIUM SCH MG: 50 TABLET, FILM COATED ORAL at 17:43

## 2023-01-24 RX ADMIN — MUPIROCIN SCH GM: 20 OINTMENT TOPICAL at 13:00

## 2023-01-24 RX ADMIN — ANORECTAL OINTMENT SCH APPLIC: 15.7; .44; 24; 20.6 OINTMENT TOPICAL at 09:35

## 2023-01-24 RX ADMIN — Medication SCH MG: at 09:37

## 2023-01-24 RX ADMIN — Medication SCH DROP: at 09:34

## 2023-01-24 RX ADMIN — MUPIROCIN SCH GM: 20 OINTMENT TOPICAL at 09:00

## 2023-01-24 RX ADMIN — HYDROCODONE BITARTRATE AND ACETAMINOPHEN PRN TAB: 5; 325 TABLET ORAL at 13:34

## 2023-01-24 RX ADMIN — Medication SCH DROP: at 21:10

## 2023-01-24 NOTE — NUR
BP re-checked 129/64. Patient sleeping comfortably. No s/s of respiratory distress, no s/s 
of pain/discomforts.

## 2023-01-24 NOTE — NUR
Received report from sarah Marie nurse. Received pt in bed asleep, no s/s of distress 
noted, no respiratory distress noted.

## 2023-01-24 NOTE — NUR
Patient complaint of back pain in scale of 10/10. Norco given as ordered and needed. SBP 
170/71. Denies any s/s of hypertension. Will re check BP after an hour. Will monitor.

## 2023-01-24 NOTE — NUR
Pt c/o itchiness from Lidocaine patch, requested removal. Patches removed x 2. Pt stated 
pain down from 10/10 to 8.5/10. Will continue to monitor pain.

-------------------------------------------------------------------------------

Addendum: 01/24/23 at 1301 by LELA DEAL RN

-------------------------------------------------------------------------------

Very slight redness noted when removing patches, but no rash. Will continue to monitor.

## 2023-01-24 NOTE — NUR
Pt requested to only take 1/2 of her prn Norco. Spoke with Dr. OMER Doherty who said 1/2 was fine 
to just waste the other half. Only 1/2 5-325mg Norco given at this time.

-------------------------------------------------------------------------------

Addendum: 01/24/23 at 1434 by LELA DEAL RN

-------------------------------------------------------------------------------

2nd half of Norco given at 1430. Per pt c/o pain 10/10 pain post PT. Will follow up.

## 2023-01-25 VITALS — SYSTOLIC BLOOD PRESSURE: 162 MMHG | DIASTOLIC BLOOD PRESSURE: 70 MMHG

## 2023-01-25 VITALS — SYSTOLIC BLOOD PRESSURE: 130 MMHG | DIASTOLIC BLOOD PRESSURE: 71 MMHG

## 2023-01-25 VITALS — SYSTOLIC BLOOD PRESSURE: 129 MMHG | DIASTOLIC BLOOD PRESSURE: 58 MMHG

## 2023-01-25 VITALS — SYSTOLIC BLOOD PRESSURE: 155 MMHG | DIASTOLIC BLOOD PRESSURE: 67 MMHG

## 2023-01-25 LAB
BUN SERPL-MCNC: 26 MG/DL (ref 7–18)
CHLORIDE SERPL-SCNC: 100 MMOL/L (ref 98–107)
CO2 SERPL-SCNC: 31 MMOL/L (ref 21–32)
CREAT SERPL-MCNC: 0.9 MG/DL (ref 0.6–1.3)
GLUCOSE SERPL-MCNC: 92 MG/DL (ref 74–106)
HCT VFR BLD AUTO: 36.9 % (ref 31.2–41.9)
MAGNESIUM SERPL-MCNC: 2 MG/DL (ref 1.8–2.4)
MCH RBC QN AUTO: 30.2 UUG (ref 24.7–32.8)
MCV RBC AUTO: 91.9 FL (ref 75.5–95.3)
PHOSPHATE SERPL-MCNC: 3.8 MG/DL (ref 2.5–4.9)
PLATELET # BLD AUTO: 207 K/UL (ref 179–408)
POTASSIUM SERPL-SCNC: 3.9 MMOL/L (ref 3.5–5.1)

## 2023-01-25 RX ADMIN — OYSTER SHELL CALCIUM WITH VITAMIN D SCH UDTAB: 500; 200 TABLET, FILM COATED ORAL at 20:47

## 2023-01-25 RX ADMIN — Medication PRN MG: at 14:48

## 2023-01-25 RX ADMIN — LOSARTAN POTASSIUM SCH MG: 50 TABLET, FILM COATED ORAL at 08:44

## 2023-01-25 RX ADMIN — ANORECTAL OINTMENT SCH APPLIC: 15.7; .44; 24; 20.6 OINTMENT TOPICAL at 20:48

## 2023-01-25 RX ADMIN — Medication SCH MG: at 08:43

## 2023-01-25 RX ADMIN — ANORECTAL OINTMENT SCH APPLIC: 15.7; .44; 24; 20.6 OINTMENT TOPICAL at 08:45

## 2023-01-25 RX ADMIN — ACETAMINOPHEN AND CODEINE PHOSPHATE PRN TAB: 30; 300 TABLET ORAL at 16:03

## 2023-01-25 RX ADMIN — Medication SCH MG: at 20:47

## 2023-01-25 RX ADMIN — Medication PRN MG: at 23:13

## 2023-01-25 RX ADMIN — Medication PRN MG: at 08:44

## 2023-01-25 RX ADMIN — HYDROCODONE BITARTRATE AND ACETAMINOPHEN PRN TAB: 5; 325 TABLET ORAL at 00:31

## 2023-01-25 RX ADMIN — FAMOTIDINE SCH MG: 20 TABLET, FILM COATED ORAL at 08:43

## 2023-01-25 RX ADMIN — LIDOCAINE PRN GM: 50 OINTMENT TOPICAL at 08:53

## 2023-01-25 RX ADMIN — MUPIROCIN SCH GM: 20 OINTMENT TOPICAL at 08:48

## 2023-01-25 RX ADMIN — LIDOCAINE SCH PATCH: 50 PATCH CUTANEOUS at 08:57

## 2023-01-25 RX ADMIN — Medication PRN MG: at 00:31

## 2023-01-25 RX ADMIN — SIMETHICONE CHEW TAB 80 MG PRN MG: 80 TABLET ORAL at 09:02

## 2023-01-25 RX ADMIN — OYSTER SHELL CALCIUM WITH VITAMIN D SCH UDTAB: 500; 200 TABLET, FILM COATED ORAL at 08:56

## 2023-01-25 RX ADMIN — ACETAMINOPHEN AND CODEINE PHOSPHATE PRN TAB: 30; 300 TABLET ORAL at 09:09

## 2023-01-25 RX ADMIN — LOSARTAN POTASSIUM SCH MG: 50 TABLET, FILM COATED ORAL at 17:00

## 2023-01-25 RX ADMIN — DOCUSATE SODIUM SCH MG: 100 CAPSULE, LIQUID FILLED ORAL at 20:46

## 2023-01-25 RX ADMIN — ATORVASTATIN CALCIUM SCH MG: 20 TABLET, FILM COATED ORAL at 20:47

## 2023-01-25 RX ADMIN — MUPIROCIN SCH GM: 20 OINTMENT TOPICAL at 12:58

## 2023-01-25 RX ADMIN — Medication SCH DROP: at 20:46

## 2023-01-25 RX ADMIN — Medication SCH DROP: at 08:45

## 2023-01-25 RX ADMIN — ALPRAZOLAM PRN MG: 0.5 TABLET ORAL at 00:31

## 2023-01-25 NOTE — NUR
RECEIVED REPORT FROM BENJAMIN AMARAL RN. PATIENT IS ALERT & ORIENTED X4 AND SPEAKS ENGLISH. 
VITAL SIGNS STABLE. PATIENT TOLERATES PO MEDICATIONS AND DIET WELL. PATIENT PARTICIPATES 
WITH PHYSICAL AND OCCUPATIONAL THERAPY AS SCHEDULE. PATIENT VOIDS ADEQUATELY. PATIENT 
REPORTS PAIN, RN MANAGED PAIN WITH PAIN MEDICATIONS AS ORDERED. NO ACUTE DISTRESS NOTED. 
FALL PRECAUTIONS OBSERVED; INCLUDING BUT NOT LIMITED TO BED IN LOWEST POSITION AND BED ALARM 
ON. ALL NEEDS MET AT THIS TIME. ENDORSED CARE TO ILDA ROY, FOR CONTINUATION OF CARE.

## 2023-01-25 NOTE — NUR
NSG: Received report from RN . Received patient sitting up in bed.awake. pleasant upon 
approach.  no s/s of distress noted, no respiratory distress noted. call light w/in reach

## 2023-01-26 VITALS — DIASTOLIC BLOOD PRESSURE: 60 MMHG | SYSTOLIC BLOOD PRESSURE: 162 MMHG

## 2023-01-26 VITALS — DIASTOLIC BLOOD PRESSURE: 68 MMHG | SYSTOLIC BLOOD PRESSURE: 140 MMHG

## 2023-01-26 VITALS — SYSTOLIC BLOOD PRESSURE: 148 MMHG | DIASTOLIC BLOOD PRESSURE: 60 MMHG

## 2023-01-26 RX ADMIN — FAMOTIDINE SCH MG: 20 TABLET, FILM COATED ORAL at 09:09

## 2023-01-26 RX ADMIN — DOCUSATE SODIUM SCH MG: 100 CAPSULE, LIQUID FILLED ORAL at 21:05

## 2023-01-26 RX ADMIN — ANORECTAL OINTMENT SCH APPLIC: 15.7; .44; 24; 20.6 OINTMENT TOPICAL at 21:06

## 2023-01-26 RX ADMIN — Medication SCH MG: at 09:09

## 2023-01-26 RX ADMIN — LIDOCAINE SCH PATCH: 50 PATCH CUTANEOUS at 09:00

## 2023-01-26 RX ADMIN — OYSTER SHELL CALCIUM WITH VITAMIN D SCH UDTAB: 500; 200 TABLET, FILM COATED ORAL at 21:05

## 2023-01-26 RX ADMIN — LOSARTAN POTASSIUM SCH MG: 50 TABLET, FILM COATED ORAL at 09:08

## 2023-01-26 RX ADMIN — LOSARTAN POTASSIUM SCH MG: 50 TABLET, FILM COATED ORAL at 16:47

## 2023-01-26 RX ADMIN — OYSTER SHELL CALCIUM WITH VITAMIN D SCH UDTAB: 500; 200 TABLET, FILM COATED ORAL at 09:00

## 2023-01-26 RX ADMIN — HYDROCODONE BITARTRATE AND ACETAMINOPHEN PRN TAB: 5; 325 TABLET ORAL at 02:22

## 2023-01-26 RX ADMIN — Medication PRN MG: at 11:02

## 2023-01-26 RX ADMIN — Medication SCH MG: at 21:05

## 2023-01-26 RX ADMIN — Medication SCH MG: at 21:06

## 2023-01-26 RX ADMIN — ANORECTAL OINTMENT SCH APPLIC: 15.7; .44; 24; 20.6 OINTMENT TOPICAL at 09:14

## 2023-01-26 RX ADMIN — ALPRAZOLAM PRN MG: 0.5 TABLET ORAL at 15:11

## 2023-01-26 RX ADMIN — Medication SCH ML: at 12:08

## 2023-01-26 RX ADMIN — Medication PRN MG: at 22:50

## 2023-01-26 RX ADMIN — ATORVASTATIN CALCIUM SCH MG: 20 TABLET, FILM COATED ORAL at 21:05

## 2023-01-26 RX ADMIN — Medication PRN MG: at 03:56

## 2023-01-26 RX ADMIN — Medication SCH DROP: at 21:05

## 2023-01-26 RX ADMIN — LIDOCAINE PRN GM: 50 OINTMENT TOPICAL at 09:13

## 2023-01-26 RX ADMIN — AMLODIPINE BESYLATE PRN MG: 5 TABLET ORAL at 12:07

## 2023-01-26 RX ADMIN — Medication SCH DROP: at 09:08

## 2023-01-26 RX ADMIN — ACETAMINOPHEN AND CODEINE PHOSPHATE PRN TAB: 30; 300 TABLET ORAL at 12:59

## 2023-01-26 NOTE — NUR
NSG: Received patient lying in bed. patient is awake and pleasant upon approach.  no s/s of 
distress noted, no respiratory distress noted. call light w/in reach

## 2023-01-26 NOTE — NUR
RECEIVED REPORT FROM SHALINDER, NOC SHIFT LVN. PATIENT IS ALERT & ORIENTED X4 AND SPEAKS 
ENGLISH. VITAL SIGNS STABLE.GAVE BLOOD PRESSURE MEDICATIONS AS ORDERED. GAVE XANAX AS 
PATIENT FELT ANXIOUS. PATIENT TOLERATES PO MEDICATIONS AND DIET WELL. PATIENT PARTICIPATES 
WITH PHYSICAL AND OCCUPATIONAL THERAPY AS SCHEDULE. PATIENT VOIDS ADEQUATELY. PATIENT 
REPORTS PAIN, RN MANAGED PAIN WITH PAIN MEDICATIONS AS ORDERED. NO ACUTE DISTRESS NOTED. 
PATIENT HAD ISSUES WITH NOT BEING ABLE TO SEE DOCTORS. RN PROVIDED EPIC HOTLINE FOR HER TO 
USE. FALL PRECAUTIONS OBSERVED; INCLUDING BUT NOT LIMITED TO BED IN LOWEST POSITION AND BED 
ALARM ON. ALL NEEDS MET AT THIS TIME. ENDORSED CARE TO JACKI ROY, FOR CONTINUATION OF 
CARE.

## 2023-01-26 NOTE — NUR
PATIENT REFUSED LAB. PATIENT STATES: "THEY HAVE DRAWN MY BLOOD ALREADY. I WANT TO SEE 
RESULTS. I WANT TO KNOW THE REASON WHY THE DOCTOR WANTS TO TAKE MY BLOOD. I WANT TO TALK TO 
THE DOCTOR." RN NOTED. RN UNABLE TO GET PATIENT TO COOPERATE. RN INFORMS ARIANE, FROM LAB, 
REGARDING PATIENT REFUSAL. RN NOTIFIES MD.

## 2023-01-27 VITALS — DIASTOLIC BLOOD PRESSURE: 75 MMHG | SYSTOLIC BLOOD PRESSURE: 170 MMHG

## 2023-01-27 VITALS — SYSTOLIC BLOOD PRESSURE: 171 MMHG | DIASTOLIC BLOOD PRESSURE: 68 MMHG

## 2023-01-27 VITALS — DIASTOLIC BLOOD PRESSURE: 74 MMHG | SYSTOLIC BLOOD PRESSURE: 163 MMHG

## 2023-01-27 VITALS — SYSTOLIC BLOOD PRESSURE: 135 MMHG | DIASTOLIC BLOOD PRESSURE: 71 MMHG

## 2023-01-27 RX ADMIN — HYDROCODONE BITARTRATE AND ACETAMINOPHEN PRN TAB: 5; 325 TABLET ORAL at 09:25

## 2023-01-27 RX ADMIN — OYSTER SHELL CALCIUM WITH VITAMIN D SCH UDTAB: 500; 200 TABLET, FILM COATED ORAL at 20:22

## 2023-01-27 RX ADMIN — LOSARTAN POTASSIUM SCH MG: 50 TABLET, FILM COATED ORAL at 15:52

## 2023-01-27 RX ADMIN — ANORECTAL OINTMENT SCH APPLIC: 15.7; .44; 24; 20.6 OINTMENT TOPICAL at 20:23

## 2023-01-27 RX ADMIN — Medication SCH DROP: at 20:39

## 2023-01-27 RX ADMIN — DOCUSATE SODIUM SCH MG: 100 CAPSULE, LIQUID FILLED ORAL at 20:22

## 2023-01-27 RX ADMIN — Medication SCH MG: at 20:23

## 2023-01-27 RX ADMIN — Medication SCH ML: at 09:29

## 2023-01-27 RX ADMIN — ATORVASTATIN CALCIUM SCH MG: 20 TABLET, FILM COATED ORAL at 20:24

## 2023-01-27 RX ADMIN — LIDOCAINE SCH PATCH: 50 PATCH CUTANEOUS at 09:23

## 2023-01-27 RX ADMIN — Medication SCH DROP: at 09:25

## 2023-01-27 RX ADMIN — ACETAMINOPHEN AND CODEINE PHOSPHATE PRN TAB: 30; 300 TABLET ORAL at 20:23

## 2023-01-27 RX ADMIN — ANORECTAL OINTMENT SCH APPLIC: 15.7; .44; 24; 20.6 OINTMENT TOPICAL at 09:25

## 2023-01-27 RX ADMIN — LOSARTAN POTASSIUM SCH MG: 50 TABLET, FILM COATED ORAL at 09:24

## 2023-01-27 RX ADMIN — ALPRAZOLAM PRN MG: 0.5 TABLET ORAL at 04:34

## 2023-01-27 RX ADMIN — Medication SCH MG: at 20:37

## 2023-01-27 RX ADMIN — FAMOTIDINE SCH MG: 20 TABLET, FILM COATED ORAL at 09:24

## 2023-01-27 RX ADMIN — OYSTER SHELL CALCIUM WITH VITAMIN D SCH UDTAB: 500; 200 TABLET, FILM COATED ORAL at 09:24

## 2023-01-27 RX ADMIN — Medication SCH MG: at 09:24

## 2023-01-27 NOTE — NUR
SHIFT NOTE: RECEIVED PT REFUSED BLOOD DRAW MD AWARE  BUT PATIENT TOOK MEDICATION.  PT IS 
ALERT AND ORIENTED X3 PT HAD PT TODAY.  TOLERATED WELL.  WILL ENDORSE TO NEXT SHIFT NURSE.  
NO  ADVERSE REACTION FROM MEDICATION.  WILL CONTINUE TO MONITOR FOR SAFETY AND FALLS.

## 2023-01-27 NOTE — NUR
NSG: REMAIN CALM AND COOPERATIVE WITH MEDS AND CARE. PATIENT AMBULATE WITH FWW ,UNSTEADY 
GAIT. SLEPT WILL THROUGH THE NIGHT.CALL LIGHT W/IN REACH.

## 2023-01-28 VITALS — SYSTOLIC BLOOD PRESSURE: 136 MMHG | DIASTOLIC BLOOD PRESSURE: 76 MMHG

## 2023-01-28 VITALS — DIASTOLIC BLOOD PRESSURE: 72 MMHG | SYSTOLIC BLOOD PRESSURE: 162 MMHG

## 2023-01-28 VITALS — SYSTOLIC BLOOD PRESSURE: 163 MMHG | DIASTOLIC BLOOD PRESSURE: 77 MMHG

## 2023-01-28 VITALS — DIASTOLIC BLOOD PRESSURE: 86 MMHG | SYSTOLIC BLOOD PRESSURE: 139 MMHG

## 2023-01-28 RX ADMIN — HYDROCODONE BITARTRATE AND ACETAMINOPHEN PRN TAB: 5; 325 TABLET ORAL at 20:38

## 2023-01-28 RX ADMIN — ALPRAZOLAM PRN MG: 0.5 TABLET ORAL at 07:50

## 2023-01-28 RX ADMIN — LOSARTAN POTASSIUM SCH MG: 50 TABLET, FILM COATED ORAL at 16:26

## 2023-01-28 RX ADMIN — ANORECTAL OINTMENT SCH APPLIC: 15.7; .44; 24; 20.6 OINTMENT TOPICAL at 08:04

## 2023-01-28 RX ADMIN — SIMETHICONE CHEW TAB 80 MG PRN MG: 80 TABLET ORAL at 13:03

## 2023-01-28 RX ADMIN — DOCUSATE SODIUM SCH MG: 100 CAPSULE, LIQUID FILLED ORAL at 20:41

## 2023-01-28 RX ADMIN — Medication PRN MG: at 23:22

## 2023-01-28 RX ADMIN — ACETAMINOPHEN PRN MG: 500 TABLET ORAL at 16:36

## 2023-01-28 RX ADMIN — SIMETHICONE CHEW TAB 80 MG PRN MG: 80 TABLET ORAL at 04:42

## 2023-01-28 RX ADMIN — OYSTER SHELL CALCIUM WITH VITAMIN D SCH UDTAB: 500; 200 TABLET, FILM COATED ORAL at 08:10

## 2023-01-28 RX ADMIN — Medication SCH DROP: at 20:43

## 2023-01-28 RX ADMIN — Medication SCH DROP: at 08:03

## 2023-01-28 RX ADMIN — Medication SCH MG: at 08:03

## 2023-01-28 RX ADMIN — HYDROCODONE BITARTRATE AND ACETAMINOPHEN PRN TAB: 5; 325 TABLET ORAL at 04:42

## 2023-01-28 RX ADMIN — Medication PRN MG: at 04:42

## 2023-01-28 RX ADMIN — ANORECTAL OINTMENT SCH APPLIC: 15.7; .44; 24; 20.6 OINTMENT TOPICAL at 20:39

## 2023-01-28 RX ADMIN — OYSTER SHELL CALCIUM WITH VITAMIN D SCH UDTAB: 500; 200 TABLET, FILM COATED ORAL at 20:39

## 2023-01-28 RX ADMIN — FAMOTIDINE SCH MG: 20 TABLET, FILM COATED ORAL at 08:01

## 2023-01-28 RX ADMIN — Medication SCH ML: at 08:03

## 2023-01-28 RX ADMIN — LOSARTAN POTASSIUM SCH MG: 50 TABLET, FILM COATED ORAL at 08:02

## 2023-01-28 RX ADMIN — AMLODIPINE BESYLATE PRN MG: 5 TABLET ORAL at 13:03

## 2023-01-28 RX ADMIN — Medication PRN MG: at 13:03

## 2023-01-28 RX ADMIN — ATORVASTATIN CALCIUM SCH MG: 20 TABLET, FILM COATED ORAL at 20:39

## 2023-01-28 RX ADMIN — Medication SCH MG: at 20:39

## 2023-01-28 RX ADMIN — LIDOCAINE SCH PATCH: 50 PATCH CUTANEOUS at 08:04

## 2023-01-28 RX ADMIN — Medication PRN MG: at 00:11

## 2023-01-28 NOTE — NUR
AAOx4 Ambulatory with walker. Admitted for lumbar compression/

chronic back pain. Needs attended. VSS Kept comfortable. No

acute distress noted. Tolerated po meds well. Medicated for pain

as needed with relief noted. Voiding well. Will monitor patient.

## 2023-01-29 VITALS — DIASTOLIC BLOOD PRESSURE: 72 MMHG | SYSTOLIC BLOOD PRESSURE: 123 MMHG

## 2023-01-29 VITALS — DIASTOLIC BLOOD PRESSURE: 68 MMHG | SYSTOLIC BLOOD PRESSURE: 164 MMHG

## 2023-01-29 VITALS — DIASTOLIC BLOOD PRESSURE: 83 MMHG | SYSTOLIC BLOOD PRESSURE: 170 MMHG

## 2023-01-29 VITALS — SYSTOLIC BLOOD PRESSURE: 119 MMHG | DIASTOLIC BLOOD PRESSURE: 46 MMHG

## 2023-01-29 RX ADMIN — Medication PRN MG: at 23:11

## 2023-01-29 RX ADMIN — Medication SCH ML: at 08:19

## 2023-01-29 RX ADMIN — ACETAMINOPHEN PRN MG: 500 TABLET ORAL at 16:28

## 2023-01-29 RX ADMIN — Medication PRN MG: at 08:17

## 2023-01-29 RX ADMIN — AMLODIPINE BESYLATE PRN MG: 5 TABLET ORAL at 05:18

## 2023-01-29 RX ADMIN — Medication SCH MG: at 20:40

## 2023-01-29 RX ADMIN — FAMOTIDINE SCH MG: 20 TABLET, FILM COATED ORAL at 08:17

## 2023-01-29 RX ADMIN — ATORVASTATIN CALCIUM SCH MG: 20 TABLET, FILM COATED ORAL at 20:39

## 2023-01-29 RX ADMIN — OYSTER SHELL CALCIUM WITH VITAMIN D SCH UDTAB: 500; 200 TABLET, FILM COATED ORAL at 08:17

## 2023-01-29 RX ADMIN — Medication SCH DROP: at 08:17

## 2023-01-29 RX ADMIN — LOSARTAN POTASSIUM SCH MG: 50 TABLET, FILM COATED ORAL at 08:17

## 2023-01-29 RX ADMIN — SIMETHICONE CHEW TAB 80 MG PRN MG: 80 TABLET ORAL at 16:28

## 2023-01-29 RX ADMIN — HYDROCODONE BITARTRATE AND ACETAMINOPHEN PRN TAB: 5; 325 TABLET ORAL at 18:18

## 2023-01-29 RX ADMIN — Medication SCH DROP: at 20:41

## 2023-01-29 RX ADMIN — OYSTER SHELL CALCIUM WITH VITAMIN D SCH UDTAB: 500; 200 TABLET, FILM COATED ORAL at 20:39

## 2023-01-29 RX ADMIN — LOSARTAN POTASSIUM SCH MG: 50 TABLET, FILM COATED ORAL at 16:29

## 2023-01-29 RX ADMIN — SIMETHICONE CHEW TAB 80 MG PRN MG: 80 TABLET ORAL at 08:17

## 2023-01-29 RX ADMIN — Medication SCH MG: at 08:18

## 2023-01-29 RX ADMIN — DOCUSATE SODIUM SCH MG: 100 CAPSULE, LIQUID FILLED ORAL at 20:44

## 2023-01-29 RX ADMIN — ANORECTAL OINTMENT SCH APPLIC: 15.7; .44; 24; 20.6 OINTMENT TOPICAL at 20:40

## 2023-01-29 RX ADMIN — OYSTER SHELL CALCIUM WITH VITAMIN D SCH UDTAB: 500; 200 TABLET, FILM COATED ORAL at 20:44

## 2023-01-29 RX ADMIN — ALPRAZOLAM PRN MG: 0.5 TABLET ORAL at 05:57

## 2023-01-29 RX ADMIN — HYDROCODONE BITARTRATE AND ACETAMINOPHEN PRN TAB: 5; 325 TABLET ORAL at 10:37

## 2023-01-29 RX ADMIN — ACETAMINOPHEN PRN MG: 500 TABLET ORAL at 05:18

## 2023-01-29 RX ADMIN — DOCUSATE SODIUM SCH MG: 100 CAPSULE, LIQUID FILLED ORAL at 20:39

## 2023-01-29 RX ADMIN — ANORECTAL OINTMENT SCH APPLIC: 15.7; .44; 24; 20.6 OINTMENT TOPICAL at 08:18

## 2023-01-29 NOTE — NUR
patient is alert, oriented x4, no sob, respirations are even nonlabored, skin warm and dry 
to touch, IV lasix administered, patient only allowed to insert #24 gauge, and remove it 
after. per patient request IV lasix administered and peripheral #24 was removed. reinforced 
the teaching about safety, patient is noted ambulating without fww, holding on to furniture 
most of the time in the room, fixing her stuff all the time, verbalized understanding of 
risk to fall and safety precautions. educated before administering the lasix the need to use 
the bathroom call for assistant, patient  verbalized the understanding and agree with plan.

## 2023-01-30 VITALS — SYSTOLIC BLOOD PRESSURE: 133 MMHG | DIASTOLIC BLOOD PRESSURE: 67 MMHG

## 2023-01-30 VITALS — DIASTOLIC BLOOD PRESSURE: 58 MMHG | SYSTOLIC BLOOD PRESSURE: 121 MMHG

## 2023-01-30 VITALS — DIASTOLIC BLOOD PRESSURE: 68 MMHG | SYSTOLIC BLOOD PRESSURE: 150 MMHG

## 2023-01-30 VITALS — DIASTOLIC BLOOD PRESSURE: 70 MMHG | SYSTOLIC BLOOD PRESSURE: 152 MMHG

## 2023-01-30 VITALS — DIASTOLIC BLOOD PRESSURE: 73 MMHG | SYSTOLIC BLOOD PRESSURE: 151 MMHG

## 2023-01-30 RX ADMIN — Medication SCH DROP: at 21:00

## 2023-01-30 RX ADMIN — Medication SCH ML: at 08:35

## 2023-01-30 RX ADMIN — SIMETHICONE CHEW TAB 80 MG PRN MG: 80 TABLET ORAL at 04:21

## 2023-01-30 RX ADMIN — DOCUSATE SODIUM SCH MG: 100 CAPSULE, LIQUID FILLED ORAL at 21:00

## 2023-01-30 RX ADMIN — OYSTER SHELL CALCIUM WITH VITAMIN D SCH UDTAB: 500; 200 TABLET, FILM COATED ORAL at 21:00

## 2023-01-30 RX ADMIN — Medication PRN MG: at 22:27

## 2023-01-30 RX ADMIN — FAMOTIDINE SCH MG: 20 TABLET, FILM COATED ORAL at 08:31

## 2023-01-30 RX ADMIN — ACETAMINOPHEN PRN MG: 500 TABLET ORAL at 16:22

## 2023-01-30 RX ADMIN — AMLODIPINE BESYLATE PRN MG: 5 TABLET ORAL at 16:19

## 2023-01-30 RX ADMIN — HYDROCODONE BITARTRATE AND ACETAMINOPHEN PRN TAB: 5; 325 TABLET ORAL at 11:19

## 2023-01-30 RX ADMIN — Medication SCH MG: at 22:22

## 2023-01-30 RX ADMIN — ANORECTAL OINTMENT SCH APPLIC: 15.7; .44; 24; 20.6 OINTMENT TOPICAL at 21:00

## 2023-01-30 RX ADMIN — LOSARTAN POTASSIUM SCH MG: 50 TABLET, FILM COATED ORAL at 08:31

## 2023-01-30 RX ADMIN — HYDROCODONE BITARTRATE AND ACETAMINOPHEN PRN TAB: 5; 325 TABLET ORAL at 22:28

## 2023-01-30 RX ADMIN — SIMETHICONE CHEW TAB 80 MG PRN MG: 80 TABLET ORAL at 22:27

## 2023-01-30 RX ADMIN — Medication SCH DROP: at 11:00

## 2023-01-30 RX ADMIN — Medication SCH MG: at 08:42

## 2023-01-30 RX ADMIN — ALPRAZOLAM PRN MG: 0.5 TABLET ORAL at 04:21

## 2023-01-30 RX ADMIN — Medication PRN MG: at 22:23

## 2023-01-30 RX ADMIN — OYSTER SHELL CALCIUM WITH VITAMIN D SCH UDTAB: 500; 200 TABLET, FILM COATED ORAL at 08:33

## 2023-01-30 RX ADMIN — ATORVASTATIN CALCIUM SCH MG: 20 TABLET, FILM COATED ORAL at 22:14

## 2023-01-30 RX ADMIN — ACETAMINOPHEN PRN MG: 500 TABLET ORAL at 08:31

## 2023-01-30 RX ADMIN — LOSARTAN POTASSIUM SCH MG: 50 TABLET, FILM COATED ORAL at 17:26

## 2023-01-30 RX ADMIN — ANORECTAL OINTMENT SCH APPLIC: 15.7; .44; 24; 20.6 OINTMENT TOPICAL at 08:34

## 2023-01-31 VITALS — SYSTOLIC BLOOD PRESSURE: 137 MMHG | DIASTOLIC BLOOD PRESSURE: 45 MMHG

## 2023-01-31 VITALS — DIASTOLIC BLOOD PRESSURE: 63 MMHG | SYSTOLIC BLOOD PRESSURE: 108 MMHG

## 2023-01-31 VITALS — SYSTOLIC BLOOD PRESSURE: 147 MMHG | DIASTOLIC BLOOD PRESSURE: 72 MMHG

## 2023-01-31 VITALS — DIASTOLIC BLOOD PRESSURE: 67 MMHG | SYSTOLIC BLOOD PRESSURE: 152 MMHG

## 2023-01-31 VITALS — SYSTOLIC BLOOD PRESSURE: 134 MMHG | DIASTOLIC BLOOD PRESSURE: 68 MMHG

## 2023-01-31 RX ADMIN — LOSARTAN POTASSIUM SCH MG: 50 TABLET, FILM COATED ORAL at 08:36

## 2023-01-31 RX ADMIN — AMLODIPINE BESYLATE PRN MG: 5 TABLET ORAL at 15:55

## 2023-01-31 RX ADMIN — HYDROCHLOROTHIAZIDE SCH MG: 12.5 CAPSULE ORAL at 08:35

## 2023-01-31 RX ADMIN — HYDROCODONE BITARTRATE AND ACETAMINOPHEN PRN TAB: 5; 325 TABLET ORAL at 06:55

## 2023-01-31 RX ADMIN — FAMOTIDINE SCH MG: 20 TABLET, FILM COATED ORAL at 08:35

## 2023-01-31 RX ADMIN — DOCUSATE SODIUM SCH MG: 100 CAPSULE, LIQUID FILLED ORAL at 20:48

## 2023-01-31 RX ADMIN — Medication PRN MG: at 15:52

## 2023-01-31 RX ADMIN — Medication SCH ML: at 08:36

## 2023-01-31 RX ADMIN — ALPRAZOLAM PRN MG: 0.5 TABLET ORAL at 03:09

## 2023-01-31 RX ADMIN — ANORECTAL OINTMENT SCH APPLIC: 15.7; .44; 24; 20.6 OINTMENT TOPICAL at 08:37

## 2023-01-31 RX ADMIN — Medication SCH MG: at 20:44

## 2023-01-31 RX ADMIN — Medication SCH DROP: at 20:48

## 2023-01-31 RX ADMIN — LOSARTAN POTASSIUM SCH MG: 50 TABLET, FILM COATED ORAL at 16:56

## 2023-01-31 RX ADMIN — ANORECTAL OINTMENT SCH APPLIC: 15.7; .44; 24; 20.6 OINTMENT TOPICAL at 20:46

## 2023-01-31 RX ADMIN — ATORVASTATIN CALCIUM SCH MG: 20 TABLET, FILM COATED ORAL at 20:45

## 2023-01-31 RX ADMIN — OYSTER SHELL CALCIUM WITH VITAMIN D SCH UDTAB: 500; 200 TABLET, FILM COATED ORAL at 08:35

## 2023-01-31 RX ADMIN — OYSTER SHELL CALCIUM WITH VITAMIN D SCH UDTAB: 500; 200 TABLET, FILM COATED ORAL at 20:44

## 2023-01-31 RX ADMIN — SIMETHICONE CHEW TAB 80 MG PRN MG: 80 TABLET ORAL at 05:44

## 2023-01-31 RX ADMIN — HYDROCODONE BITARTRATE AND ACETAMINOPHEN PRN TAB: 5; 325 TABLET ORAL at 16:06

## 2023-01-31 RX ADMIN — SIMETHICONE CHEW TAB 80 MG PRN MG: 80 TABLET ORAL at 15:52

## 2023-01-31 RX ADMIN — Medication PRN MG: at 05:44

## 2023-01-31 RX ADMIN — Medication SCH MG: at 08:36

## 2023-01-31 RX ADMIN — Medication PRN MG: at 22:21

## 2023-01-31 NOTE — NUR
Endorsed pt to sarah Najera nurse. Worked this shift with Claudia BRUNSON [facility] RN student. 
Pt stable, no s/s of distress noted.

## 2023-01-31 NOTE — NUR
Pt spoke to Dr. Daugherty, per pt request reguarding pt not wanting to get her labs drawn. Per 
pt, Dr. Daugherty said that he would review her chart and not to get her labs drawn until after 
he talks to her tomorrow.

## 2023-01-31 NOTE — NUR
Patient awake,alert,and oriented x4. Room air, wearing o2 at 2l per nc prn. Medicated prn 
for complaint of pain in lower back and posterior neck pain. Edema in bilateral feet. 
Patient instructed to call for assistance to go to the restroom and to get out of bed.

## 2023-02-01 VITALS — DIASTOLIC BLOOD PRESSURE: 59 MMHG | SYSTOLIC BLOOD PRESSURE: 134 MMHG

## 2023-02-01 VITALS — SYSTOLIC BLOOD PRESSURE: 108 MMHG | DIASTOLIC BLOOD PRESSURE: 43 MMHG

## 2023-02-01 VITALS — SYSTOLIC BLOOD PRESSURE: 130 MMHG | DIASTOLIC BLOOD PRESSURE: 65 MMHG

## 2023-02-01 VITALS — DIASTOLIC BLOOD PRESSURE: 70 MMHG | SYSTOLIC BLOOD PRESSURE: 160 MMHG

## 2023-02-01 LAB
ALP SERPL-CCNC: 93 U/L (ref 50–136)
ALT SERPL W/O P-5'-P-CCNC: 20 U/L (ref 14–59)
AST SERPL-CCNC: 20 U/L (ref 15–37)
BILIRUB SERPL-MCNC: 0.5 MG/DL (ref 0.2–1)
BUN SERPL-MCNC: 22 MG/DL (ref 7–18)
CHLORIDE SERPL-SCNC: 102 MMOL/L (ref 98–107)
CO2 SERPL-SCNC: 29 MMOL/L (ref 21–32)
CREAT SERPL-MCNC: 0.9 MG/DL (ref 0.6–1.3)
GLUCOSE SERPL-MCNC: 100 MG/DL (ref 74–106)
HCT VFR BLD AUTO: 35.2 % (ref 31.2–41.9)
MAGNESIUM SERPL-MCNC: 2 MG/DL (ref 1.8–2.4)
MCH RBC QN AUTO: 30.7 UUG (ref 24.7–32.8)
MCV RBC AUTO: 91.3 FL (ref 75.5–95.3)
PHOSPHATE SERPL-MCNC: 4.3 MG/DL (ref 2.5–4.9)
PLATELET # BLD AUTO: 209 K/UL (ref 179–408)
POTASSIUM SERPL-SCNC: 4 MMOL/L (ref 3.5–5.1)
WS STN SPEC: 6.2 G/DL (ref 6.4–8.2)

## 2023-02-01 RX ADMIN — DOCUSATE SODIUM SCH MG: 100 CAPSULE, LIQUID FILLED ORAL at 20:43

## 2023-02-01 RX ADMIN — Medication SCH MG: at 20:38

## 2023-02-01 RX ADMIN — FAMOTIDINE SCH MG: 20 TABLET, FILM COATED ORAL at 09:41

## 2023-02-01 RX ADMIN — ALPRAZOLAM PRN MG: 0.5 TABLET ORAL at 22:19

## 2023-02-01 RX ADMIN — OYSTER SHELL CALCIUM WITH VITAMIN D SCH UDTAB: 500; 200 TABLET, FILM COATED ORAL at 20:37

## 2023-02-01 RX ADMIN — Medication PRN MG: at 03:54

## 2023-02-01 RX ADMIN — Medication SCH MG: at 09:52

## 2023-02-01 RX ADMIN — Medication PRN MG: at 17:20

## 2023-02-01 RX ADMIN — ATORVASTATIN CALCIUM SCH MG: 20 TABLET, FILM COATED ORAL at 20:37

## 2023-02-01 RX ADMIN — AMLODIPINE BESYLATE PRN MG: 5 TABLET ORAL at 17:21

## 2023-02-01 RX ADMIN — LOSARTAN POTASSIUM SCH MG: 50 TABLET, FILM COATED ORAL at 17:21

## 2023-02-01 RX ADMIN — SIMETHICONE CHEW TAB 80 MG PRN MG: 80 TABLET ORAL at 03:54

## 2023-02-01 RX ADMIN — BENZOCAINE PRN ML: 100 SOLUTION TOPICAL at 13:37

## 2023-02-01 RX ADMIN — Medication SCH DROP: at 20:40

## 2023-02-01 RX ADMIN — HYDROCODONE BITARTRATE AND ACETAMINOPHEN PRN TAB: 5; 325 TABLET ORAL at 03:37

## 2023-02-01 RX ADMIN — OYSTER SHELL CALCIUM WITH VITAMIN D SCH UDTAB: 500; 200 TABLET, FILM COATED ORAL at 09:41

## 2023-02-01 RX ADMIN — SIMETHICONE CHEW TAB 80 MG PRN MG: 80 TABLET ORAL at 17:20

## 2023-02-01 RX ADMIN — ACETAMINOPHEN AND CODEINE PHOSPHATE PRN TAB: 30; 300 TABLET ORAL at 12:10

## 2023-02-01 RX ADMIN — ANORECTAL OINTMENT SCH APPLIC: 15.7; .44; 24; 20.6 OINTMENT TOPICAL at 09:43

## 2023-02-01 RX ADMIN — Medication SCH DROP: at 11:57

## 2023-02-01 RX ADMIN — ACETAMINOPHEN AND CODEINE PHOSPHATE PRN TAB: 30; 300 TABLET ORAL at 17:21

## 2023-02-01 RX ADMIN — LOSARTAN POTASSIUM SCH MG: 50 TABLET, FILM COATED ORAL at 09:52

## 2023-02-01 RX ADMIN — ANORECTAL OINTMENT SCH APPLIC: 15.7; .44; 24; 20.6 OINTMENT TOPICAL at 20:38

## 2023-02-01 RX ADMIN — HYDROCHLOROTHIAZIDE SCH MG: 12.5 CAPSULE ORAL at 09:41

## 2023-02-01 RX ADMIN — Medication SCH ML: at 09:00

## 2023-02-01 NOTE — NUR
Endorsed pt to Dania Night shift nurse. Pt stable, VSS, no s/s of distress noted, no sob 
at this time.

## 2023-02-01 NOTE — NUR
Pt c/o Left tooth and ear pain, Anbesol given per order. Pt very needy, I changed her pain 
meds to include Tylenol with codeine per pt request and MD order. Pt stated, "This life 
isn't worth living anymore. All my friends have . I need to make new friends." Pt c/o 
wheezing. "It comes and goes," she says. He lungs are clear to auscultation throughout. O2 
Sat 97-98% on RA. Will continue to monitor.

## 2023-02-01 NOTE — NUR
AAOx4 All needs attended. All due meds given without difficulty. 

Ambulates to the BR with supervision. Voiding well. Kept comfortable.

Needs attended. Pain meds given as needed. VSS. Fall precautions

maintained. Siderails up for safety.

## 2023-02-01 NOTE — NUR
Pt was VERY FORGETFUL just now. I walked into pt's room after having been her nurse for the 
last day and a half and she said, "Who are you?" I said "I'm Ailin. Remember me? I'm your 
nurse." She said, "Weren't you wearing green before." I said, "Yes, that was yesterday." She 
siad, "Did you change something?" I said, "yes, my hair was up before. She said Oh. She 
seemed strangely forgetful.

## 2023-02-02 VITALS — SYSTOLIC BLOOD PRESSURE: 122 MMHG | DIASTOLIC BLOOD PRESSURE: 71 MMHG

## 2023-02-02 VITALS — DIASTOLIC BLOOD PRESSURE: 68 MMHG | SYSTOLIC BLOOD PRESSURE: 143 MMHG

## 2023-02-02 VITALS — SYSTOLIC BLOOD PRESSURE: 124 MMHG | DIASTOLIC BLOOD PRESSURE: 51 MMHG

## 2023-02-02 VITALS — DIASTOLIC BLOOD PRESSURE: 87 MMHG | SYSTOLIC BLOOD PRESSURE: 144 MMHG

## 2023-02-02 RX ADMIN — HYDROCODONE BITARTRATE AND ACETAMINOPHEN PRN TAB: 5; 325 TABLET ORAL at 03:20

## 2023-02-02 RX ADMIN — Medication SCH MG: at 08:54

## 2023-02-02 RX ADMIN — Medication SCH MG: at 20:50

## 2023-02-02 RX ADMIN — OYSTER SHELL CALCIUM WITH VITAMIN D SCH UDTAB: 500; 200 TABLET, FILM COATED ORAL at 20:49

## 2023-02-02 RX ADMIN — ANORECTAL OINTMENT SCH APPLIC: 15.7; .44; 24; 20.6 OINTMENT TOPICAL at 09:26

## 2023-02-02 RX ADMIN — OYSTER SHELL CALCIUM WITH VITAMIN D SCH UDTAB: 500; 200 TABLET, FILM COATED ORAL at 08:47

## 2023-02-02 RX ADMIN — DOCUSATE SODIUM SCH MG: 100 CAPSULE, LIQUID FILLED ORAL at 20:55

## 2023-02-02 RX ADMIN — ACETAMINOPHEN PRN MG: 500 TABLET ORAL at 04:36

## 2023-02-02 RX ADMIN — Medication SCH ML: at 09:26

## 2023-02-02 RX ADMIN — ANORECTAL OINTMENT SCH APPLIC: 15.7; .44; 24; 20.6 OINTMENT TOPICAL at 20:51

## 2023-02-02 RX ADMIN — Medication SCH DROP: at 08:47

## 2023-02-02 RX ADMIN — FAMOTIDINE SCH MG: 20 TABLET, FILM COATED ORAL at 08:47

## 2023-02-02 RX ADMIN — Medication SCH DROP: at 20:54

## 2023-02-02 RX ADMIN — ATORVASTATIN CALCIUM SCH MG: 20 TABLET, FILM COATED ORAL at 20:56

## 2023-02-02 RX ADMIN — Medication SCH EACH: at 20:49

## 2023-02-02 RX ADMIN — HYDROCHLOROTHIAZIDE SCH MG: 12.5 CAPSULE ORAL at 08:47

## 2023-02-02 RX ADMIN — LOSARTAN POTASSIUM SCH MG: 50 TABLET, FILM COATED ORAL at 17:41

## 2023-02-02 RX ADMIN — ACETAMINOPHEN AND CODEINE PHOSPHATE PRN TAB: 30; 300 TABLET ORAL at 17:44

## 2023-02-02 RX ADMIN — LOSARTAN POTASSIUM SCH MG: 50 TABLET, FILM COATED ORAL at 08:54

## 2023-02-02 NOTE — NUR
XCondition unchanged. Patient AAOx2-3 forgetful at times. All po

meds given without difficulty. Ambulates to the BR with standby 

assist. Medicated for pain as needed. VSS. No acute distress

noted. Will monitor patient. Easily redirected. Repositioned for

comfort.

## 2023-02-03 VITALS — DIASTOLIC BLOOD PRESSURE: 71 MMHG | SYSTOLIC BLOOD PRESSURE: 125 MMHG

## 2023-02-03 VITALS — SYSTOLIC BLOOD PRESSURE: 150 MMHG | DIASTOLIC BLOOD PRESSURE: 82 MMHG

## 2023-02-03 VITALS — SYSTOLIC BLOOD PRESSURE: 142 MMHG | DIASTOLIC BLOOD PRESSURE: 69 MMHG

## 2023-02-03 VITALS — DIASTOLIC BLOOD PRESSURE: 68 MMHG | SYSTOLIC BLOOD PRESSURE: 122 MMHG

## 2023-02-03 LAB
APPEARANCE UR: CLEAR
BILIRUB UR QL STRIP: NEGATIVE
COLOR UR: YELLOW
DEPRECATED SQUAMOUS URNS QL MICRO: (no result) /HPF
GLUCOSE UR STRIP-MCNC: NEGATIVE MG/DL
HGB UR QL STRIP: (no result)
KETONES UR STRIP-MCNC: NEGATIVE MG/DL
LEUKOCYTE ESTERASE UR QL STRIP: NEGATIVE
MUCOUS THREADS URNS QL MICRO: (no result) /LPF
NITRITE UR QL STRIP: NEGATIVE
PH UR STRIP: 7 [PH] (ref 5–8)
RBC #/AREA URNS HPF: (no result) /HPF (ref 0–3)
SP GR UR STRIP: 1.01 (ref 1–1.03)
UROBILINOGEN UR STRIP-MCNC: 0.2 E.U./DL
WBC #/AREA URNS HPF: (no result) /HPF
WBC #/AREA URNS HPF: (no result) /HPF (ref 0–3)

## 2023-02-03 RX ADMIN — DICYCLOMINE HYDROCHLORIDE PRN MG: 10 CAPSULE ORAL at 02:47

## 2023-02-03 RX ADMIN — HYDROCHLOROTHIAZIDE SCH MG: 12.5 CAPSULE ORAL at 07:49

## 2023-02-03 RX ADMIN — DOCUSATE SODIUM SCH MG: 100 CAPSULE, LIQUID FILLED ORAL at 21:06

## 2023-02-03 RX ADMIN — FAMOTIDINE SCH MG: 20 TABLET, FILM COATED ORAL at 07:49

## 2023-02-03 RX ADMIN — OYSTER SHELL CALCIUM WITH VITAMIN D SCH UDTAB: 500; 200 TABLET, FILM COATED ORAL at 21:06

## 2023-02-03 RX ADMIN — OYSTER SHELL CALCIUM WITH VITAMIN D SCH UDTAB: 500; 200 TABLET, FILM COATED ORAL at 07:49

## 2023-02-03 RX ADMIN — ANORECTAL OINTMENT SCH APPLIC: 15.7; .44; 24; 20.6 OINTMENT TOPICAL at 08:33

## 2023-02-03 RX ADMIN — AMLODIPINE BESYLATE PRN MG: 5 TABLET ORAL at 15:02

## 2023-02-03 RX ADMIN — LOSARTAN POTASSIUM SCH MG: 50 TABLET, FILM COATED ORAL at 08:32

## 2023-02-03 RX ADMIN — Medication PRN MG: at 14:49

## 2023-02-03 RX ADMIN — ACETAMINOPHEN AND CODEINE PHOSPHATE PRN TAB: 30; 300 TABLET ORAL at 14:49

## 2023-02-03 RX ADMIN — ANORECTAL OINTMENT SCH APPLIC: 15.7; .44; 24; 20.6 OINTMENT TOPICAL at 21:36

## 2023-02-03 RX ADMIN — Medication SCH TAB: at 08:31

## 2023-02-03 RX ADMIN — Medication SCH MG: at 21:06

## 2023-02-03 RX ADMIN — Medication SCH EACH: at 21:06

## 2023-02-03 RX ADMIN — Medication SCH MG: at 08:31

## 2023-02-03 RX ADMIN — ALPRAZOLAM PRN MG: 0.5 TABLET ORAL at 21:22

## 2023-02-03 RX ADMIN — Medication SCH ML: at 08:32

## 2023-02-03 RX ADMIN — Medication PRN ML: at 08:31

## 2023-02-03 RX ADMIN — Medication SCH DROP: at 21:09

## 2023-02-03 RX ADMIN — HYDROCODONE BITARTRATE AND ACETAMINOPHEN PRN TAB: 5; 325 TABLET ORAL at 07:49

## 2023-02-03 RX ADMIN — ATORVASTATIN CALCIUM SCH MG: 20 TABLET, FILM COATED ORAL at 21:06

## 2023-02-03 RX ADMIN — HYDROCODONE BITARTRATE AND ACETAMINOPHEN PRN TAB: 5; 325 TABLET ORAL at 02:55

## 2023-02-03 RX ADMIN — THERA TABS SCH UDTAB: TAB at 08:32

## 2023-02-03 RX ADMIN — Medication SCH DROP: at 08:31

## 2023-02-03 RX ADMIN — ACETAMINOPHEN AND CODEINE PHOSPHATE PRN TAB: 30; 300 TABLET ORAL at 21:21

## 2023-02-03 RX ADMIN — Medication SCH EACH: at 08:31

## 2023-02-03 RX ADMIN — SIMETHICONE CHEW TAB 80 MG PRN MG: 80 TABLET ORAL at 09:26

## 2023-02-03 RX ADMIN — LOSARTAN POTASSIUM SCH MG: 50 TABLET, FILM COATED ORAL at 17:36

## 2023-02-03 NOTE — NUR
AAOx2-3 forgetful at times. Ambulates to the BR with supervision.

All needs attended. All due meds given without difficulty. patient

constantly complaining of generalized pain. Medicated with Norco 

for back pain and also spasm on her abdomen, Bentyl given as well.

Had a large BM this shift. Will monitor patient.

## 2023-02-04 VITALS — SYSTOLIC BLOOD PRESSURE: 125 MMHG | DIASTOLIC BLOOD PRESSURE: 64 MMHG

## 2023-02-04 VITALS — SYSTOLIC BLOOD PRESSURE: 157 MMHG | DIASTOLIC BLOOD PRESSURE: 76 MMHG

## 2023-02-04 VITALS — DIASTOLIC BLOOD PRESSURE: 42 MMHG | SYSTOLIC BLOOD PRESSURE: 110 MMHG

## 2023-02-04 VITALS — SYSTOLIC BLOOD PRESSURE: 98 MMHG | DIASTOLIC BLOOD PRESSURE: 48 MMHG

## 2023-02-04 RX ADMIN — SIMETHICONE CHEW TAB 80 MG PRN MG: 80 TABLET ORAL at 08:57

## 2023-02-04 RX ADMIN — Medication SCH EACH: at 20:26

## 2023-02-04 RX ADMIN — ATORVASTATIN CALCIUM SCH MG: 20 TABLET, FILM COATED ORAL at 20:26

## 2023-02-04 RX ADMIN — FAMOTIDINE SCH MG: 20 TABLET, FILM COATED ORAL at 08:45

## 2023-02-04 RX ADMIN — Medication PRN MG: at 15:08

## 2023-02-04 RX ADMIN — Medication SCH ML: at 08:47

## 2023-02-04 RX ADMIN — Medication SCH MG: at 20:27

## 2023-02-04 RX ADMIN — Medication PRN ML: at 08:46

## 2023-02-04 RX ADMIN — Medication SCH DROP: at 08:46

## 2023-02-04 RX ADMIN — ANORECTAL OINTMENT SCH APPLIC: 15.7; .44; 24; 20.6 OINTMENT TOPICAL at 08:46

## 2023-02-04 RX ADMIN — BENZOCAINE PRN ML: 100 SOLUTION TOPICAL at 08:46

## 2023-02-04 RX ADMIN — Medication PRN MG: at 02:24

## 2023-02-04 RX ADMIN — Medication PRN MG: at 08:57

## 2023-02-04 RX ADMIN — Medication SCH DROP: at 20:34

## 2023-02-04 RX ADMIN — Medication SCH EACH: at 08:44

## 2023-02-04 RX ADMIN — Medication SCH MG: at 08:44

## 2023-02-04 RX ADMIN — DICYCLOMINE HYDROCHLORIDE PRN MG: 10 CAPSULE ORAL at 02:31

## 2023-02-04 RX ADMIN — Medication SCH TAB: at 08:52

## 2023-02-04 RX ADMIN — SIMETHICONE CHEW TAB 80 MG PRN MG: 80 TABLET ORAL at 02:31

## 2023-02-04 RX ADMIN — ANORECTAL OINTMENT SCH APPLIC: 15.7; .44; 24; 20.6 OINTMENT TOPICAL at 20:28

## 2023-02-04 RX ADMIN — OYSTER SHELL CALCIUM WITH VITAMIN D SCH UDTAB: 500; 200 TABLET, FILM COATED ORAL at 20:27

## 2023-02-04 RX ADMIN — HYDROCODONE BITARTRATE AND ACETAMINOPHEN PRN TAB: 5; 325 TABLET ORAL at 08:57

## 2023-02-04 RX ADMIN — HYDROCHLOROTHIAZIDE SCH MG: 12.5 CAPSULE ORAL at 08:44

## 2023-02-04 RX ADMIN — LOSARTAN POTASSIUM SCH MG: 50 TABLET, FILM COATED ORAL at 16:08

## 2023-02-04 RX ADMIN — DOCUSATE SODIUM SCH MG: 100 CAPSULE, LIQUID FILLED ORAL at 20:26

## 2023-02-04 RX ADMIN — THERA TABS SCH UDTAB: TAB at 08:44

## 2023-02-04 RX ADMIN — ALPRAZOLAM PRN MG: 0.5 TABLET ORAL at 23:44

## 2023-02-04 RX ADMIN — OYSTER SHELL CALCIUM WITH VITAMIN D SCH UDTAB: 500; 200 TABLET, FILM COATED ORAL at 08:45

## 2023-02-04 RX ADMIN — HYDROCODONE BITARTRATE AND ACETAMINOPHEN PRN TAB: 5; 325 TABLET ORAL at 15:09

## 2023-02-04 RX ADMIN — SIMETHICONE CHEW TAB 80 MG PRN MG: 80 TABLET ORAL at 15:07

## 2023-02-04 RX ADMIN — LOSARTAN POTASSIUM SCH MG: 50 TABLET, FILM COATED ORAL at 08:45

## 2023-02-04 NOTE — NUR
NSG: Received Patient awake,alert,and oriented x4 sitting on chair in her room.  Room air, 
wearing o2 at 2l per nc prn.  Edema in bilateral feet. Patient instructed to call for 
assistance to go to the restroom and to get out of bed. call light w/in reach.

## 2023-02-05 VITALS — SYSTOLIC BLOOD PRESSURE: 123 MMHG | DIASTOLIC BLOOD PRESSURE: 54 MMHG

## 2023-02-05 VITALS — DIASTOLIC BLOOD PRESSURE: 86 MMHG | SYSTOLIC BLOOD PRESSURE: 164 MMHG

## 2023-02-05 VITALS — SYSTOLIC BLOOD PRESSURE: 144 MMHG | DIASTOLIC BLOOD PRESSURE: 68 MMHG

## 2023-02-05 VITALS — DIASTOLIC BLOOD PRESSURE: 58 MMHG | SYSTOLIC BLOOD PRESSURE: 127 MMHG

## 2023-02-05 RX ADMIN — ANORECTAL OINTMENT SCH APPLIC: 15.7; .44; 24; 20.6 OINTMENT TOPICAL at 21:00

## 2023-02-05 RX ADMIN — OYSTER SHELL CALCIUM WITH VITAMIN D SCH UDTAB: 500; 200 TABLET, FILM COATED ORAL at 08:16

## 2023-02-05 RX ADMIN — Medication SCH EACH: at 08:13

## 2023-02-05 RX ADMIN — HYDROCHLOROTHIAZIDE SCH MG: 12.5 CAPSULE ORAL at 08:13

## 2023-02-05 RX ADMIN — HYDROCODONE BITARTRATE AND ACETAMINOPHEN PRN TAB: 5; 325 TABLET ORAL at 08:13

## 2023-02-05 RX ADMIN — Medication SCH DROP: at 08:15

## 2023-02-05 RX ADMIN — ACETAMINOPHEN PRN MG: 500 TABLET ORAL at 17:26

## 2023-02-05 RX ADMIN — Medication SCH ML: at 08:16

## 2023-02-05 RX ADMIN — ANORECTAL OINTMENT SCH APPLIC: 15.7; .44; 24; 20.6 OINTMENT TOPICAL at 08:16

## 2023-02-05 RX ADMIN — LOSARTAN POTASSIUM SCH MG: 50 TABLET, FILM COATED ORAL at 08:15

## 2023-02-05 RX ADMIN — Medication SCH MG: at 08:14

## 2023-02-05 RX ADMIN — ACETAMINOPHEN PRN MG: 500 TABLET ORAL at 05:11

## 2023-02-05 RX ADMIN — Medication PRN MG: at 08:13

## 2023-02-05 RX ADMIN — Medication SCH DROP: at 21:00

## 2023-02-05 RX ADMIN — FAMOTIDINE SCH MG: 20 TABLET, FILM COATED ORAL at 08:13

## 2023-02-05 RX ADMIN — ATORVASTATIN CALCIUM SCH MG: 20 TABLET, FILM COATED ORAL at 21:00

## 2023-02-05 RX ADMIN — SIMETHICONE CHEW TAB 80 MG PRN MG: 80 TABLET ORAL at 17:26

## 2023-02-05 RX ADMIN — Medication SCH EACH: at 21:00

## 2023-02-05 RX ADMIN — LOSARTAN POTASSIUM SCH MG: 50 TABLET, FILM COATED ORAL at 16:54

## 2023-02-05 RX ADMIN — DOCUSATE SODIUM SCH MG: 100 CAPSULE, LIQUID FILLED ORAL at 21:00

## 2023-02-05 RX ADMIN — Medication SCH TAB: at 08:15

## 2023-02-05 RX ADMIN — THERA TABS SCH UDTAB: TAB at 08:13

## 2023-02-05 RX ADMIN — Medication SCH MG: at 21:00

## 2023-02-05 RX ADMIN — HYDROCODONE BITARTRATE AND ACETAMINOPHEN PRN TAB: 5; 325 TABLET ORAL at 14:12

## 2023-02-05 RX ADMIN — OYSTER SHELL CALCIUM WITH VITAMIN D SCH UDTAB: 500; 200 TABLET, FILM COATED ORAL at 21:00

## 2023-02-05 RX ADMIN — SIMETHICONE CHEW TAB 80 MG PRN MG: 80 TABLET ORAL at 16:28

## 2023-02-05 RX ADMIN — SIMETHICONE CHEW TAB 80 MG PRN MG: 80 TABLET ORAL at 08:13

## 2023-02-05 NOTE — NUR
patient is alert, oriented x4, no sob, respirations are even nonlabored, skin warm and dry 
to touch, ambulatory, self care. kept clean and dry. no events noted during shift, no acute 
distress noted.

## 2023-02-06 VITALS — DIASTOLIC BLOOD PRESSURE: 67 MMHG | SYSTOLIC BLOOD PRESSURE: 167 MMHG

## 2023-02-06 RX ADMIN — Medication SCH TAB: at 10:39

## 2023-02-06 RX ADMIN — ACETAMINOPHEN AND CODEINE PHOSPHATE PRN TAB: 30; 300 TABLET ORAL at 07:27

## 2023-02-06 RX ADMIN — HYDROCHLOROTHIAZIDE SCH MG: 12.5 CAPSULE ORAL at 10:26

## 2023-02-06 RX ADMIN — HYDROCODONE BITARTRATE AND ACETAMINOPHEN PRN TAB: 5; 325 TABLET ORAL at 13:40

## 2023-02-06 RX ADMIN — ANORECTAL OINTMENT SCH APPLIC: 15.7; .44; 24; 20.6 OINTMENT TOPICAL at 10:05

## 2023-02-06 RX ADMIN — Medication SCH DROP: at 10:37

## 2023-02-06 RX ADMIN — Medication SCH MG: at 10:29

## 2023-02-06 RX ADMIN — OYSTER SHELL CALCIUM WITH VITAMIN D SCH UDTAB: 500; 200 TABLET, FILM COATED ORAL at 10:37

## 2023-02-06 RX ADMIN — Medication SCH EACH: at 10:03

## 2023-02-06 RX ADMIN — Medication PRN MG: at 07:34

## 2023-02-06 RX ADMIN — LOSARTAN POTASSIUM SCH MG: 50 TABLET, FILM COATED ORAL at 10:30

## 2023-02-06 RX ADMIN — Medication SCH ML: at 10:06

## 2023-02-06 RX ADMIN — THERA TABS SCH UDTAB: TAB at 10:37

## 2023-02-06 RX ADMIN — AMLODIPINE BESYLATE PRN MG: 5 TABLET ORAL at 13:40

## 2023-02-06 RX ADMIN — FAMOTIDINE SCH MG: 20 TABLET, FILM COATED ORAL at 10:04

## 2023-02-06 NOTE — NUR
RECEIVED REPORT FROM BENJAMIN BROWN RN. PATIENT IS ALERT & ORIENTED X4, AND SPEAKS ENGLISH. 
PATIENT VOIDS ADEQUATELY. VITAL SIGNS STABLE. PATIENT PARTICIPATES WITH PHYSICAL AND 
OCCUPATIONAL THERAPY PER SCHEDULE. PATIENT TOLERATES PO MEDICATIONS AND DIET WELL. PATIENT 
REFUSED SOME MEDICATIONS. RN PROVIDED EDUCATION. PATIENT VERBALIZED UNDERSTANDING. NO ACUTE 
DISTRESS NOTED. PATIENT SET TO BE DISCHARGE TODAY. PATIENT MADE AWARE AND AGREEABLE TO 
DISCHARGE.



PRIOR TO DISCHARGE, PATIENT EXPRESSED PAIN AND WANTED RN TO TAKE HER BLOOD PRESSURE. RN TOOK 
BLOOD PRESSURE AND GAVE BLOOD PRESSURE MEDICATION AS ORDERED BY MD AND GAVE NORCO TO TREAT 
PAIN. PATIENT BELONGINGS LIST SIGNED. DISCHARGE INSTRUCTIONS GIVEN. PATIENT VERBALIZED 
UNDERSTANDING AND SIGNED DISCHARGE PAPERWORK. PATIENT DISCHARGED IN STABLE CONDITION WITH RN 
AND STUDENT RN, CAIN, TO ACCESS BUS SERVICES.

## 2023-02-06 NOTE — NUR
shift note: pt c/o pain gave tylenol with codeine and of stomach upset gave pepto bismol per 
patient request.  other medication given as ordered no signs of distress noted.Patient 
sleplt throughout the night fall and safety precaution maintained throughout the shift.  
Will continue to monitor endorse to am shift.

## 2023-02-06 NOTE — NUR
DURING MEDICATION PASS AM, PATIENT REFUSED HER BLOOD PRESSURE MEDICATIONS. PATIENT NOTIFIED 
RN THAT PATIENT TOOK HER PERSONAL HOME MEDICATIONS. RN DID NOT WITNESS HER TAKE MEDICATIONS.

## 2023-02-08 VITALS — SYSTOLIC BLOOD PRESSURE: 114 MMHG | DIASTOLIC BLOOD PRESSURE: 80 MMHG

## 2024-07-29 NOTE — NUR
Norco given for pain at 1130am. Patient is complaining of pain. She refused to have her 
blood drawn today, its been five day now. She is alert and oriented x4. She is breathing on 
room air. Currently she is walking about in her room and stable. Will continue to monitor. Introduction Text (Please End With A Colon): The following was deferred: Procedure To Be Performed At Next Visit: Excision X Size Of Lesion In Cm (Optional): 0 Detail Level: Detailed